# Patient Record
Sex: FEMALE | Race: WHITE | NOT HISPANIC OR LATINO | Employment: FULL TIME | URBAN - METROPOLITAN AREA
[De-identification: names, ages, dates, MRNs, and addresses within clinical notes are randomized per-mention and may not be internally consistent; named-entity substitution may affect disease eponyms.]

---

## 2017-04-13 ENCOUNTER — INITIAL PRENATAL (OUTPATIENT)
Dept: OBSTETRICS AND GYNECOLOGY | Facility: CLINIC | Age: 27
End: 2017-04-13

## 2017-04-13 VITALS — DIASTOLIC BLOOD PRESSURE: 80 MMHG | WEIGHT: 147.6 LBS | SYSTOLIC BLOOD PRESSURE: 135 MMHG

## 2017-04-13 DIAGNOSIS — Z12.4 SCREENING FOR CERVICAL CANCER: ICD-10-CM

## 2017-04-13 DIAGNOSIS — O09.32 LATE PRENATAL CARE AFFECTING PREGNANCY IN SECOND TRIMESTER: ICD-10-CM

## 2017-04-13 DIAGNOSIS — Z11.4 SCREENING FOR HIV (HUMAN IMMUNODEFICIENCY VIRUS): ICD-10-CM

## 2017-04-13 DIAGNOSIS — F11.20 METHADONE MAINTENANCE TREATMENT AFFECTING PREGNANCY IN SECOND TRIMESTER (HCC): ICD-10-CM

## 2017-04-13 DIAGNOSIS — O99.332 TOBACCO SMOKING AFFECTING PREGNANCY IN SECOND TRIMESTER: ICD-10-CM

## 2017-04-13 DIAGNOSIS — Z11.3 SCREEN FOR STD (SEXUALLY TRANSMITTED DISEASE): ICD-10-CM

## 2017-04-13 DIAGNOSIS — Z34.02 ENCOUNTER FOR SUPERVISION OF NORMAL FIRST PREGNANCY IN SECOND TRIMESTER: Primary | ICD-10-CM

## 2017-04-13 DIAGNOSIS — O99.322 METHADONE MAINTENANCE TREATMENT AFFECTING PREGNANCY IN SECOND TRIMESTER (HCC): ICD-10-CM

## 2017-04-13 LAB
EXTERNAL GC/CHLAMYDIA: NORMAL
EXTERNAL THINPREP: NORMAL
EXTERNAL URINE CULTURE: NORMAL

## 2017-04-13 PROCEDURE — 99406 BEHAV CHNG SMOKING 3-10 MIN: CPT | Performed by: OBSTETRICS & GYNECOLOGY

## 2017-04-13 PROCEDURE — 0501F PRENATAL FLOW SHEET: CPT | Performed by: OBSTETRICS & GYNECOLOGY

## 2017-04-13 RX ORDER — METHADONE HYDROCHLORIDE 10 MG/ML
70 CONCENTRATE ORAL DAILY
COMMUNITY

## 2017-04-13 NOTE — PROGRESS NOTES
Chief complaint: Pregnancy  History present illness: Patient is here for initial prenatal visit today.  She is without complaints.  She has not been seen by an obstetrician this pregnancy.  She has not had an ultrasound her blood work done.  She denies contractions, vaginal bleeding, leakage of fluid.  The patient does take methadone 60 mg per day.  She has been on this for about 5 years total.  She has a previous history of opioid narcotic abuse.  She denies any history of IV drug use.  She denies any current signs or symptoms of withdrawal.    History reviewed. No pertinent past medical history.     Past Surgical History:   Procedure Laterality Date   • APPENDECTOMY       History reviewed. No pertinent family history.     Social History   Substance Use Topics   • Smoking status: Current Every Day Smoker     Packs/day: 1.00     Types: Cigarettes   • Smokeless tobacco: None   • Alcohol use No       Current Outpatient Prescriptions:   •  methadone (DOLOPHINE) 10 MG tablet, Take 60 mg by mouth Every 6 (Six) Hours As Needed for Moderate Pain (4-6),, Disp: , Rfl:      Allergies   Allergen Reactions   • Ibuprofen      ROS:  General: No fever or chills  Constitutional: No weight loss or gain, no hair loss  HENT: No headache, no hearing loss, no tinnitus  Eyes: normal vision, no eye pain  Lungs: No cough, no shortness of breath  Heart: No chest pain, no palpitations  Abdomen: No nausea, vomiting, constipation or diarrhea  : No dysuria, no hematuria  Skin: No rashes  Lymph: No swelling  Neuro: No parathesia, no weakness  Psych: Normal though content, no hallucinations, no SI/HI    Pe:  Vitals:    17 0920   BP: 135/80   Weight: 147 lb 9.6 oz (67 kg)   PE: see prenatal physicial    Assessment:  1.  28 yo  2 para 0 at 22-0/7 weeks gestational age by an uncertain last menstrual period  2.  Maternal methadone use  3.  Tobacco use in pregnancy  Plan:  1.  Initial prenatal counseling performed today.  Prenatal care  educational handouts given to the patient.  Patient is due for her Pap smear today.  Pap smear done.  Gonorrhea and chlamydia test today.  Prenatal labs today.  Start a daily prenatal vitamin.  Patient reports that the last prenatal vitamin that she took causes cramping and diarrhea.  I advised that she may try Flintstones chewable.  2.  The risks of tobacco use term pregnancy were discussed with the patient.  Adverse fetal effects were discussed.  Advised total cessation.  Greater than 3 minutes was spent in tobacco cessation counseling.  3.  Discussed issues related to opioid use in pregnancy.  Discussed issues related to methadone use.  We discussed the prior studies done in pregnant women taking methadone.  We discussed the long-term safety of methadone use term pregnancy.  We did discuss the risk of  abstinence change and with a very high rate of prolonged admission for babies due to  abstinence syndrome and women taking methadone.  Despite this, the risk of discontinue methadone at this point in the pregnancy would likely include withdrawal,  labor,  delivery, miscarriage, stillbirth etc.  I would advise the patient that she should likely consider continuation of methadone at the lowest effective dose for the remainder of the pregnancy.  She verbalizes understanding and agrees with the plan.  4.  Ultrasound next available for dating and anatomy.  5.  Return to the office in 4 weeks.

## 2017-04-14 LAB
ABO GROUP BLD: (no result)
BASOPHILS # BLD AUTO: 0 X10E3/UL (ref 0–0.2)
BASOPHILS NFR BLD AUTO: 0 %
BLD GP AB SCN SERPL QL: NEGATIVE
EOSINOPHIL # BLD AUTO: 0.4 X10E3/UL (ref 0–0.4)
EOSINOPHIL NFR BLD AUTO: 5 %
ERYTHROCYTE [DISTWIDTH] IN BLOOD BY AUTOMATED COUNT: 12.8 % (ref 12.3–15.4)
HBV SURFACE AG SERPL QL IA: NEGATIVE
HCT VFR BLD AUTO: 37.8 % (ref 34–46.6)
HCV AB S/CO SERPL IA: <0.1 S/CO RATIO (ref 0–0.9)
HGB BLD-MCNC: 12.5 G/DL (ref 11.1–15.9)
HIV 1+2 AB+HIV1 P24 AG SERPL QL IA: NON REACTIVE
IMM GRANULOCYTES # BLD: 0 X10E3/UL (ref 0–0.1)
IMM GRANULOCYTES NFR BLD: 0 %
LYMPHOCYTES # BLD AUTO: 2.2 X10E3/UL (ref 0.7–3.1)
LYMPHOCYTES NFR BLD AUTO: 25 %
MCH RBC QN AUTO: 30.5 PG (ref 26.6–33)
MCHC RBC AUTO-ENTMCNC: 33.1 G/DL (ref 31.5–35.7)
MCV RBC AUTO: 92 FL (ref 79–97)
MONOCYTES # BLD AUTO: 0.9 X10E3/UL (ref 0.1–0.9)
MONOCYTES NFR BLD AUTO: 11 %
NEUTROPHILS # BLD AUTO: 5.3 X10E3/UL (ref 1.4–7)
NEUTROPHILS NFR BLD AUTO: 59 %
PLATELET # BLD AUTO: 215 X10E3/UL (ref 150–379)
RBC # BLD AUTO: 4.1 X10E6/UL (ref 3.77–5.28)
RH BLD: POSITIVE
RPR SER QL: NON REACTIVE
RUBV IGG SERPL IA-ACNC: 2.31 INDEX
WBC # BLD AUTO: 8.9 X10E3/UL (ref 3.4–10.8)

## 2017-04-15 LAB
BACTERIA UR CULT: NO GROWTH
BACTERIA UR CULT: NORMAL

## 2017-04-17 ENCOUNTER — PROCEDURE VISIT (OUTPATIENT)
Dept: OBSTETRICS AND GYNECOLOGY | Facility: CLINIC | Age: 27
End: 2017-04-17

## 2017-04-17 DIAGNOSIS — O99.332 TOBACCO SMOKING AFFECTING PREGNANCY IN SECOND TRIMESTER: ICD-10-CM

## 2017-04-17 DIAGNOSIS — O99.322 METHADONE MAINTENANCE TREATMENT AFFECTING PREGNANCY IN SECOND TRIMESTER (HCC): ICD-10-CM

## 2017-04-17 DIAGNOSIS — Z36.3 ANTENATAL SCREENING FOR MALFORMATION USING ULTRASONICS: Primary | ICD-10-CM

## 2017-04-17 DIAGNOSIS — F11.20 METHADONE MAINTENANCE TREATMENT AFFECTING PREGNANCY IN SECOND TRIMESTER (HCC): ICD-10-CM

## 2017-04-17 DIAGNOSIS — O09.32 LATE PRENATAL CARE AFFECTING PREGNANCY IN SECOND TRIMESTER: ICD-10-CM

## 2017-04-17 PROCEDURE — 76805 OB US >/= 14 WKS SNGL FETUS: CPT | Performed by: OBSTETRICS & GYNECOLOGY

## 2017-04-18 LAB
C TRACH RRNA CVX QL NAA+PROBE: NEGATIVE
CONV .: NORMAL
CYTOLOGIST CVX/VAG CYTO: NORMAL
CYTOLOGY CVX/VAG DOC THIN PREP: NORMAL
DX ICD CODE: NORMAL
HIV 1 & 2 AB SER-IMP: NORMAL
N GONORRHOEA RRNA CVX QL NAA+PROBE: NEGATIVE
OTHER STN SPEC: NORMAL
PATH REPORT.FINAL DX SPEC: NORMAL
STAT OF ADQ CVX/VAG CYTO-IMP: NORMAL
T VAGINALIS RRNA SPEC QL NAA+PROBE: NEGATIVE

## 2017-04-19 ENCOUNTER — TELEPHONE (OUTPATIENT)
Dept: OBSTETRICS AND GYNECOLOGY | Facility: CLINIC | Age: 27
End: 2017-04-19

## 2017-04-19 NOTE — TELEPHONE ENCOUNTER
----- Message from Hung Berry MD sent at 4/18/2017  5:17 PM EDT -----  Notify the patient that her Pap smear was normal, and her gonorrhea and chlamydia tests were negative

## 2017-04-19 NOTE — TELEPHONE ENCOUNTER
----- Message from Angelic Metcalf sent at 4/19/2017  8:50 AM EDT -----  Regarding: Lab Results  Patient called back, per notes i let the patient know her pap was normal and tests were negative

## 2017-04-22 LAB
AMPHETAMINES SERPL QL SCN: NEGATIVE NG/ML
BARBITURATES SERPL QL SCN: NEGATIVE UG/ML
BENZODIAZ SERPL QL SCN: NEGATIVE NG/ML
CANNABINOIDS SERPL QL SCN: NEGATIVE NG/ML
COCAINE+BZE SERPL QL SCN: NEGATIVE NG/ML
METHADONE SERPL QL SCN: ABNORMAL NG/ML
METHADONE SPEC CFM-MCNC: 365 NG/ML
METHADONE SPEC QL: POSITIVE
OPIATES SERPL QL SCN: NEGATIVE NG/ML
OXYCODONE+OXYMORPHONE SERPLBLD QL SCN: NEGATIVE NG/ML
PCP SERPL QL SCN: NEGATIVE NG/ML
PROPOXYPH SERPL QL SCN: NEGATIVE NG/ML

## 2017-05-11 ENCOUNTER — ROUTINE PRENATAL (OUTPATIENT)
Dept: OBSTETRICS AND GYNECOLOGY | Facility: CLINIC | Age: 27
End: 2017-05-11

## 2017-05-11 VITALS
DIASTOLIC BLOOD PRESSURE: 75 MMHG | BODY MASS INDEX: 25.33 KG/M2 | SYSTOLIC BLOOD PRESSURE: 129 MMHG | WEIGHT: 152 LBS | HEIGHT: 65 IN

## 2017-05-11 DIAGNOSIS — O09.32 LATE PRENATAL CARE AFFECTING PREGNANCY IN SECOND TRIMESTER: ICD-10-CM

## 2017-05-11 DIAGNOSIS — O99.322 METHADONE MAINTENANCE TREATMENT AFFECTING PREGNANCY IN SECOND TRIMESTER (HCC): ICD-10-CM

## 2017-05-11 DIAGNOSIS — Z34.02 ENCOUNTER FOR SUPERVISION OF NORMAL FIRST PREGNANCY IN SECOND TRIMESTER: Primary | ICD-10-CM

## 2017-05-11 DIAGNOSIS — F11.20 METHADONE MAINTENANCE TREATMENT AFFECTING PREGNANCY IN SECOND TRIMESTER (HCC): ICD-10-CM

## 2017-05-11 DIAGNOSIS — Z13.1 SCREENING FOR DIABETES MELLITUS: ICD-10-CM

## 2017-05-11 PROBLEM — Z11.3 SCREEN FOR STD (SEXUALLY TRANSMITTED DISEASE): Status: RESOLVED | Noted: 2017-04-13 | Resolved: 2017-05-11

## 2017-05-11 PROBLEM — Z11.4 SCREENING FOR HIV (HUMAN IMMUNODEFICIENCY VIRUS): Status: RESOLVED | Noted: 2017-04-13 | Resolved: 2017-05-11

## 2017-05-11 PROBLEM — Z12.4 SCREENING FOR CERVICAL CANCER: Status: RESOLVED | Noted: 2017-04-13 | Resolved: 2017-05-11

## 2017-05-11 PROCEDURE — 0502F SUBSEQUENT PRENATAL CARE: CPT | Performed by: OBSTETRICS & GYNECOLOGY

## 2017-06-08 ENCOUNTER — RESULTS ENCOUNTER (OUTPATIENT)
Dept: OBSTETRICS AND GYNECOLOGY | Facility: CLINIC | Age: 27
End: 2017-06-08

## 2017-06-08 ENCOUNTER — ROUTINE PRENATAL (OUTPATIENT)
Dept: OBSTETRICS AND GYNECOLOGY | Facility: CLINIC | Age: 27
End: 2017-06-08

## 2017-06-08 VITALS — DIASTOLIC BLOOD PRESSURE: 85 MMHG | BODY MASS INDEX: 25.63 KG/M2 | WEIGHT: 154 LBS | SYSTOLIC BLOOD PRESSURE: 137 MMHG

## 2017-06-08 DIAGNOSIS — Z13.1 SCREENING FOR DIABETES MELLITUS: ICD-10-CM

## 2017-06-08 DIAGNOSIS — Z34.02 ENCOUNTER FOR SUPERVISION OF NORMAL FIRST PREGNANCY IN SECOND TRIMESTER: ICD-10-CM

## 2017-06-08 DIAGNOSIS — F11.20 METHADONE MAINTENANCE TREATMENT AFFECTING PREGNANCY IN SECOND TRIMESTER (HCC): Primary | ICD-10-CM

## 2017-06-08 DIAGNOSIS — O09.32 LATE PRENATAL CARE AFFECTING PREGNANCY IN SECOND TRIMESTER: ICD-10-CM

## 2017-06-08 DIAGNOSIS — O99.322 METHADONE MAINTENANCE TREATMENT AFFECTING PREGNANCY IN SECOND TRIMESTER (HCC): Primary | ICD-10-CM

## 2017-06-08 LAB
EXTERNAL GTT 1 HOUR: 98
GLUCOSE 1H P 50 G GLC PO SERPL-MCNC: 98 MG/DL (ref 65–139)

## 2017-06-08 PROCEDURE — 0502F SUBSEQUENT PRENATAL CARE: CPT | Performed by: OBSTETRICS & GYNECOLOGY

## 2017-06-08 NOTE — PROGRESS NOTES
Chief complaint: Pregnancy  History present illness: Patient is here for her routine prenatal visit.  She is currently on methadone maintenance at 60 mg per day.  She does report some mild withdrawal symptoms.  She reports that the clinic plans to increase her dose over the next 1-2 weeks.  Otherwise she is doing well.  She reports very active fetal movement.  No contractions.  Objective: See vital signs above  Gen.: No acute distress, awake and oriented ×3  Abdomen: Soft, nontender, fundal height 26 7 m, fetal heart tones 140  Extremities: No calf tenderness, no lower extremity edema  Assessment:  1.  27-year-old  2 para 0 at 26-5/7 weeks gestational age  2.  Methadone maintenance during the second trimester  3.  Tobacco use during the second trimester  Plan:  1.  Continue with methadone.  I explained it is often necessary to increase methadone dosage during pregnancy.  She will work with her methadone clinic on this.  We have discussed the risks of  abstinence syndrome at length.  She verbalizes understanding.  2.  Glucose screen today.  3.  Return to the office in 2 weeks.  Ultrasound for growth in 2 weeks secondary to maternal tobacco and methadone use.  I spent 10 out of 15 minutes with the patient in face to face counseling of the above issues.

## 2017-06-20 ENCOUNTER — TELEPHONE (OUTPATIENT)
Dept: OBSTETRICS AND GYNECOLOGY | Facility: CLINIC | Age: 27
End: 2017-06-20

## 2017-06-20 NOTE — TELEPHONE ENCOUNTER
----- Message from Tammy Garner sent at 6/20/2017 11:39 AM EDT -----  Contact: Pt (Norman)  Ob pt's  called (on [facility] Norman) stating pt has been having pelvic and abdominal pain/pressure since yesterday along with very heavy vaginal discharge(No bleeding). Norman said pt said baby is moving but is having sharp pain. Spoke with Niya, she spoke to Dr. Berry, he advised pt to go to [facility] L[facility].  is aware.

## 2017-06-27 ENCOUNTER — PROCEDURE VISIT (OUTPATIENT)
Dept: OBSTETRICS AND GYNECOLOGY | Facility: CLINIC | Age: 27
End: 2017-06-27

## 2017-06-27 ENCOUNTER — ROUTINE PRENATAL (OUTPATIENT)
Dept: OBSTETRICS AND GYNECOLOGY | Facility: CLINIC | Age: 27
End: 2017-06-27

## 2017-06-27 VITALS — BODY MASS INDEX: 26.29 KG/M2 | DIASTOLIC BLOOD PRESSURE: 87 MMHG | SYSTOLIC BLOOD PRESSURE: 136 MMHG | WEIGHT: 158 LBS

## 2017-06-27 DIAGNOSIS — O99.322 METHADONE MAINTENANCE TREATMENT AFFECTING PREGNANCY IN SECOND TRIMESTER (HCC): ICD-10-CM

## 2017-06-27 DIAGNOSIS — O99.333 TOBACCO SMOKING AFFECTING PREGNANCY IN THIRD TRIMESTER: ICD-10-CM

## 2017-06-27 DIAGNOSIS — F11.20 METHADONE MAINTENANCE TREATMENT AFFECTING PREGNANCY IN SECOND TRIMESTER (HCC): ICD-10-CM

## 2017-06-27 DIAGNOSIS — Z23 NEED FOR TDAP VACCINATION: ICD-10-CM

## 2017-06-27 DIAGNOSIS — F11.20 METHADONE MAINTENANCE TREATMENT AFFECTING PREGNANCY IN THIRD TRIMESTER (HCC): ICD-10-CM

## 2017-06-27 DIAGNOSIS — Z36.89 ENCOUNTER FOR ULTRASOUND TO CHECK FETAL GROWTH: Primary | ICD-10-CM

## 2017-06-27 DIAGNOSIS — O99.323 METHADONE MAINTENANCE TREATMENT AFFECTING PREGNANCY IN THIRD TRIMESTER (HCC): ICD-10-CM

## 2017-06-27 DIAGNOSIS — O32.8XX0: ICD-10-CM

## 2017-06-27 DIAGNOSIS — O09.32 LATE PRENATAL CARE AFFECTING PREGNANCY IN SECOND TRIMESTER: ICD-10-CM

## 2017-06-27 DIAGNOSIS — Z34.03 ENCOUNTER FOR SUPERVISION OF NORMAL FIRST PREGNANCY IN THIRD TRIMESTER: Primary | ICD-10-CM

## 2017-06-27 DIAGNOSIS — O99.330 TOBACCO SMOKING AFFECTING PREGNANCY: ICD-10-CM

## 2017-06-27 PROBLEM — Z13.1 SCREENING FOR DIABETES MELLITUS: Status: RESOLVED | Noted: 2017-05-11 | Resolved: 2017-06-27

## 2017-06-27 PROCEDURE — 90471 IMMUNIZATION ADMIN: CPT | Performed by: OBSTETRICS & GYNECOLOGY

## 2017-06-27 PROCEDURE — 90715 TDAP VACCINE 7 YRS/> IM: CPT | Performed by: OBSTETRICS & GYNECOLOGY

## 2017-06-27 PROCEDURE — 0502F SUBSEQUENT PRENATAL CARE: CPT | Performed by: OBSTETRICS & GYNECOLOGY

## 2017-06-27 PROCEDURE — 76816 OB US FOLLOW-UP PER FETUS: CPT | Performed by: OBSTETRICS & GYNECOLOGY

## 2017-06-27 NOTE — PROGRESS NOTES
Chief complaint: Pregnancy  Subjective: Here for routine prenatal visit.  No major complaints today.  She does note some sciatic nerve pain in the right side.  Otherwise doing well.  Good fetal movement.  Objective: See vital signs in flow sheet  Gen.: No acute distress, awake and oriented ×3  Abdomen: Soft, nontender, fetal heart tones 136  Extremities: No lower extremity edema  Labs: One-hour glucose challenge 98  Ultrasound today: Estimated fetal weight 2 lbs. 15 oz. or the 46th percentile.  Amniotic fluid index 14 cm.  Footling breech presentation.  Assessment:  1.  27-year-old  2 para 0 at 29-3/7 weeks gestational age  2.  Methadone maintenance in pregnancy, currently at 65 mg per day  3.  Footling breech presentation  4.  Tobacco use  Plan:  1.  Reassurance offered about sciatic nerve pain.  2.  Patient without any signs or symptoms of withdrawal.  She does report that her clinic is planning to increase her dose to 70 mg at the next visit.  3.  Ultrasound findings discussed with the patient at length today.  We will repeat growth every 4 weeks secondary to methadone maintenance.  We will also need to check for presentation at the next visit.  4.  OB follow-up in 2 and 4 weeks.  Ultrasound in 4 weeks.

## 2017-07-13 ENCOUNTER — ROUTINE PRENATAL (OUTPATIENT)
Dept: OBSTETRICS AND GYNECOLOGY | Facility: CLINIC | Age: 27
End: 2017-07-13

## 2017-07-13 VITALS — BODY MASS INDEX: 26.13 KG/M2 | DIASTOLIC BLOOD PRESSURE: 74 MMHG | SYSTOLIC BLOOD PRESSURE: 129 MMHG | WEIGHT: 157 LBS

## 2017-07-13 DIAGNOSIS — F11.20 METHADONE MAINTENANCE TREATMENT AFFECTING PREGNANCY IN THIRD TRIMESTER (HCC): Primary | ICD-10-CM

## 2017-07-13 DIAGNOSIS — O32.8XX0: ICD-10-CM

## 2017-07-13 DIAGNOSIS — O99.333 TOBACCO SMOKING AFFECTING PREGNANCY IN THIRD TRIMESTER: ICD-10-CM

## 2017-07-13 DIAGNOSIS — O99.323 METHADONE MAINTENANCE TREATMENT AFFECTING PREGNANCY IN THIRD TRIMESTER (HCC): Primary | ICD-10-CM

## 2017-07-13 DIAGNOSIS — Z34.03 ENCOUNTER FOR SUPERVISION OF NORMAL FIRST PREGNANCY IN THIRD TRIMESTER: ICD-10-CM

## 2017-07-13 PROBLEM — Z23 NEED FOR TDAP VACCINATION: Status: RESOLVED | Noted: 2017-06-27 | Resolved: 2017-07-13

## 2017-07-13 PROCEDURE — 0502F SUBSEQUENT PRENATAL CARE: CPT | Performed by: OBSTETRICS & GYNECOLOGY

## 2017-07-13 NOTE — PROGRESS NOTES
Patient is here for routine OB follow-up.  She is without complaints today.  She reports very active fetal movement.  She thinks the baby may have flipped into a cephalic presentation.  She recently increased her methadone dosage to 70 mg, and she reports that she feels stable on this dose.  Patient does report fetal movement and sound consistent with pickups.  She reports rhythmic regular movements that occur every few seconds and last 10-20 minutes or so.  Reassurance is offered.  Plan to return to the office in 2 weeks.  Ultrasound for growth secondary to maternal methadone usage in 2 weeks.  Also check on fetal position of that ultrasound.

## 2017-07-25 ENCOUNTER — ROUTINE PRENATAL (OUTPATIENT)
Dept: OBSTETRICS AND GYNECOLOGY | Facility: CLINIC | Age: 27
End: 2017-07-25

## 2017-07-25 ENCOUNTER — PROCEDURE VISIT (OUTPATIENT)
Dept: OBSTETRICS AND GYNECOLOGY | Facility: CLINIC | Age: 27
End: 2017-07-25

## 2017-07-25 VITALS — WEIGHT: 158 LBS | DIASTOLIC BLOOD PRESSURE: 80 MMHG | BODY MASS INDEX: 26.29 KG/M2 | SYSTOLIC BLOOD PRESSURE: 129 MMHG

## 2017-07-25 DIAGNOSIS — O99.333 TOBACCO SMOKING AFFECTING PREGNANCY IN THIRD TRIMESTER: ICD-10-CM

## 2017-07-25 DIAGNOSIS — O99.323 METHADONE MAINTENANCE TREATMENT AFFECTING PREGNANCY IN THIRD TRIMESTER (HCC): ICD-10-CM

## 2017-07-25 DIAGNOSIS — O99.323 METHADONE MAINTENANCE TREATMENT AFFECTING PREGNANCY IN THIRD TRIMESTER (HCC): Primary | ICD-10-CM

## 2017-07-25 DIAGNOSIS — F11.20 METHADONE MAINTENANCE TREATMENT AFFECTING PREGNANCY IN THIRD TRIMESTER (HCC): ICD-10-CM

## 2017-07-25 DIAGNOSIS — F11.20 METHADONE MAINTENANCE TREATMENT AFFECTING PREGNANCY IN THIRD TRIMESTER (HCC): Primary | ICD-10-CM

## 2017-07-25 DIAGNOSIS — O09.32 LATE PRENATAL CARE AFFECTING PREGNANCY IN SECOND TRIMESTER: ICD-10-CM

## 2017-07-25 DIAGNOSIS — Z34.03 ENCOUNTER FOR SUPERVISION OF NORMAL FIRST PREGNANCY IN THIRD TRIMESTER: Primary | ICD-10-CM

## 2017-07-25 PROBLEM — O32.8XX0 FOOTLING BREECH PRESENTATION: Status: RESOLVED | Noted: 2017-06-27 | Resolved: 2017-07-25

## 2017-07-25 PROCEDURE — 76816 OB US FOLLOW-UP PER FETUS: CPT | Performed by: OBSTETRICS & GYNECOLOGY

## 2017-07-25 PROCEDURE — 0502F SUBSEQUENT PRENATAL CARE: CPT | Performed by: OBSTETRICS & GYNECOLOGY

## 2017-07-25 NOTE — PROGRESS NOTES
Patient here for routine OB follow-up.  She has no major complaints today.  She is generally very anxious about the pregnancy.  Also concerned about the risk of  labor.  I explained that there are no findings with the current time that would make me think the patient is any more risk of  delivery and the average risk patient.  I explained that ultrasound is not a guarantee the safety her well-being of the fetus and that there is nothing on ultrasound that would usually predict  birth.  In general she feels well today.  Denies signs or symptoms of withdrawal.  See flow sheet for physical exam and vitals.  Ultrasound today shows estimated fetal weight of 4-1/2 pounds or the 34th percentile.  Abdominal circumference is the 14th percentile.  Amniotic fluid index 11.5 cm.  The baby is in the breech presentation.  These findings were discussed with the patient.  Limitations of ultrasound were discussed.  I'll plan for the patient return to the office in 2 weeks.  Plan ultrasound in 4 weeks for growth secondary methadone use as well as presentation.  The patient asked about external cephalic version.  We discussed the risks, benefits, and alternatives to external cephalic version versus primary  section for persistent breech presentation at 39 weeks of gestation.  The process of external cephalic version was described to the patient.  After discussion of the risks and benefits, the patient states that she would not want to proceed with external cephalic version, but in stable plan for a primary  section if the baby was still breech at 39 weeks.  I spent 10 out of 15 minutes with the patient in face to face counseling of the above issues.

## 2017-08-08 ENCOUNTER — ROUTINE PRENATAL (OUTPATIENT)
Dept: OBSTETRICS AND GYNECOLOGY | Facility: CLINIC | Age: 27
End: 2017-08-08

## 2017-08-08 VITALS — WEIGHT: 158 LBS | DIASTOLIC BLOOD PRESSURE: 75 MMHG | BODY MASS INDEX: 26.29 KG/M2 | SYSTOLIC BLOOD PRESSURE: 123 MMHG

## 2017-08-08 DIAGNOSIS — O99.323 METHADONE MAINTENANCE TREATMENT AFFECTING PREGNANCY IN THIRD TRIMESTER (HCC): Primary | ICD-10-CM

## 2017-08-08 DIAGNOSIS — F11.20 METHADONE MAINTENANCE TREATMENT AFFECTING PREGNANCY IN THIRD TRIMESTER (HCC): Primary | ICD-10-CM

## 2017-08-08 DIAGNOSIS — Z34.03 ENCOUNTER FOR SUPERVISION OF NORMAL FIRST PREGNANCY IN THIRD TRIMESTER: ICD-10-CM

## 2017-08-08 PROCEDURE — 99213 OFFICE O/P EST LOW 20 MIN: CPT | Performed by: OBSTETRICS & GYNECOLOGY

## 2017-08-08 NOTE — PROGRESS NOTES
Patient here for routine prenatal visit.  No major complaints today.  Good fetal movement.  See flow sheet for vital signs of physical exam.  On exam, the baby still feels breech.  Patient is scheduled for ultrasound 2 weeks for presentation.  We'll also need to check on growth given methadone use.  Patient without any signs of withdrawal.  GBS performed today.  Labor signs discussed.  We discussed the possibility of  abstinence syndrome again in detail.  They verbalized understanding.  Return to the office as scheduled in 2 weeks.  We also discussed pediatricians.

## 2017-08-10 ENCOUNTER — TELEPHONE (OUTPATIENT)
Dept: OBSTETRICS AND GYNECOLOGY | Facility: CLINIC | Age: 27
End: 2017-08-10

## 2017-08-10 NOTE — TELEPHONE ENCOUNTER
I do not feel that this is anything to be concerned about.  If the patient thinks that she needs to be evaluated prior to her next appointment, I would recommend that she go to labor and delivery at [facility], [place] for evaluation, but overall I feel these movements are common in pregnancy and not anything that should be alarming.    ----- Message from Tammy Garner sent at 8/10/2017 11:34 AM EDT -----  Contact: pt  Ob pt called stating she if feeling the baby have hiccups a lot the past 3-4 days(this morning the episode lasted 20-25 minutes, then the baby was doing weird movements after 4-5 minutes while having the hiccups). Pt states it seems to be getting worse.   Pt is concerned.    Please advise.  Pt # is [phone]

## 2017-08-12 LAB — B-HEM STREP SPEC QL CULT: NEGATIVE

## 2017-08-22 ENCOUNTER — PROCEDURE VISIT (OUTPATIENT)
Dept: OBSTETRICS AND GYNECOLOGY | Facility: CLINIC | Age: 27
End: 2017-08-22

## 2017-08-22 ENCOUNTER — ROUTINE PRENATAL (OUTPATIENT)
Dept: OBSTETRICS AND GYNECOLOGY | Facility: CLINIC | Age: 27
End: 2017-08-22

## 2017-08-22 VITALS — SYSTOLIC BLOOD PRESSURE: 134 MMHG | BODY MASS INDEX: 26.29 KG/M2 | DIASTOLIC BLOOD PRESSURE: 88 MMHG | WEIGHT: 158 LBS

## 2017-08-22 DIAGNOSIS — O99.333 TOBACCO SMOKING AFFECTING PREGNANCY IN THIRD TRIMESTER: ICD-10-CM

## 2017-08-22 DIAGNOSIS — O99.323 METHADONE MAINTENANCE TREATMENT AFFECTING PREGNANCY IN THIRD TRIMESTER (HCC): Primary | ICD-10-CM

## 2017-08-22 DIAGNOSIS — O28.8 AFI (AMNIOTIC FLUID INDEX) DECREASED: ICD-10-CM

## 2017-08-22 DIAGNOSIS — Z36.89 ENCOUNTER FOR ULTRASOUND TO CHECK FETAL GROWTH: Primary | ICD-10-CM

## 2017-08-22 DIAGNOSIS — F11.20 METHADONE MAINTENANCE TREATMENT AFFECTING PREGNANCY IN THIRD TRIMESTER (HCC): Primary | ICD-10-CM

## 2017-08-22 DIAGNOSIS — O99.323 METHADONE MAINTENANCE TREATMENT AFFECTING PREGNANCY IN THIRD TRIMESTER (HCC): ICD-10-CM

## 2017-08-22 DIAGNOSIS — O09.32 LATE PRENATAL CARE AFFECTING PREGNANCY IN SECOND TRIMESTER: ICD-10-CM

## 2017-08-22 DIAGNOSIS — F11.20 METHADONE MAINTENANCE TREATMENT AFFECTING PREGNANCY IN THIRD TRIMESTER (HCC): ICD-10-CM

## 2017-08-22 PROCEDURE — 76816 OB US FOLLOW-UP PER FETUS: CPT | Performed by: OBSTETRICS & GYNECOLOGY

## 2017-08-22 PROCEDURE — 0502F SUBSEQUENT PRENATAL CARE: CPT | Performed by: OBSTETRICS & GYNECOLOGY

## 2017-08-22 PROCEDURE — 76818 FETAL BIOPHYS PROFILE W/NST: CPT | Performed by: OBSTETRICS & GYNECOLOGY

## 2017-08-22 NOTE — PROGRESS NOTES
Patient is here for her routine prenatal visit.  She is without major physical complaints.  Patient reports normal fetal movement, but reports the babies generally more active in the afternoon.  Patient denies any withdrawal signs or symptoms.  Her methadone dose has been stable.  No contractions.  See flow sheet for vital signs and physical exam.  Patient had ultrasound today for growth secondary to her use of methadone.  Overall estimated fetal weight is the 26th percentile (was previously the 33rd percentile).  Abdominal circumference 13 percentile (was previously the 14th percentile).  Baby is still in a byron breech presentation.  Amniotic fluid index is 8 cm.   I explained to the patient that while the baby is on the small side, overall the growth has been appropriate from the last visit.  Her amniotic fluid index is mildly decreased.  I recommended the patient increase oral hydration.  We performed an NST today, which was initially nonreactive.  This may have been secondary to her taking her methadone dose earlier this morning.  The patient reports that she has also not eaten anything today.  I sent the patient for biophysical profile testing which was 8/10 oh (2 points off for nonreactive NST).  Overall reassuring.  I would like for the patient to repeat her ultrasound for amniotic fluid index and biophysical profile in 2 days.  I will see her back in one week.  We discussed the findings of the persistent breech presentation.  I recommend primary  section at 39 weeks at the baby is still breech.  Amniotic fluid index continues to decrease, or if  testing is otherwise nonreassuring, she may require delivery sooner than 39 weeks.  She verbalizes understanding.

## 2017-08-24 ENCOUNTER — ANESTHESIA EVENT (OUTPATIENT)
Dept: LABOR AND DELIVERY | Facility: HOSPITAL | Age: 27
End: 2017-08-24

## 2017-08-24 ENCOUNTER — PROCEDURE VISIT (OUTPATIENT)
Dept: OBSTETRICS AND GYNECOLOGY | Facility: CLINIC | Age: 27
End: 2017-08-24

## 2017-08-24 ENCOUNTER — ANESTHESIA (OUTPATIENT)
Dept: LABOR AND DELIVERY | Facility: HOSPITAL | Age: 27
End: 2017-08-24

## 2017-08-24 ENCOUNTER — HOSPITAL ENCOUNTER (INPATIENT)
Facility: HOSPITAL | Age: 27
LOS: 3 days | Discharge: HOME | End: 2017-08-27
Attending: OBSTETRICS & GYNECOLOGY | Admitting: OBSTETRICS & GYNECOLOGY

## 2017-08-24 DIAGNOSIS — O99.333 TOBACCO SMOKING AFFECTING PREGNANCY IN THIRD TRIMESTER: ICD-10-CM

## 2017-08-24 DIAGNOSIS — O99.323 METHADONE MAINTENANCE TREATMENT AFFECTING PREGNANCY IN THIRD TRIMESTER (HCC): Primary | ICD-10-CM

## 2017-08-24 DIAGNOSIS — O99.323 METHADONE MAINTENANCE TREATMENT AFFECTING PREGNANCY IN THIRD TRIMESTER (HCC): ICD-10-CM

## 2017-08-24 DIAGNOSIS — F11.20 METHADONE MAINTENANCE TREATMENT AFFECTING PREGNANCY IN THIRD TRIMESTER (HCC): Primary | ICD-10-CM

## 2017-08-24 DIAGNOSIS — F11.20 METHADONE MAINTENANCE TREATMENT AFFECTING PREGNANCY IN THIRD TRIMESTER (HCC): ICD-10-CM

## 2017-08-24 DIAGNOSIS — O09.32 LATE PRENATAL CARE AFFECTING PREGNANCY IN SECOND TRIMESTER: ICD-10-CM

## 2017-08-24 PROBLEM — Z34.90 PREGNANCY: Status: ACTIVE | Noted: 2017-08-24

## 2017-08-24 PROBLEM — Z98.891 S/P PRIMARY LOW TRANSVERSE C-SECTION: Status: ACTIVE | Noted: 2017-08-24

## 2017-08-24 LAB
ABO GROUP BLD: NORMAL
AMPHET+METHAMPHET UR QL: NEGATIVE
BARBITURATES UR QL SCN: NEGATIVE
BASOPHILS # BLD AUTO: 0.01 10*3/MM3 (ref 0–0.2)
BASOPHILS NFR BLD AUTO: 0.1 % (ref 0–1.5)
BENZODIAZ UR QL SCN: NEGATIVE
BLD GP AB SCN SERPL QL: NEGATIVE
CANNABINOIDS SERPL QL: NEGATIVE
COCAINE UR QL: NEGATIVE
DEPRECATED RDW RBC AUTO: 42.1 FL (ref 37–54)
EOSINOPHIL # BLD AUTO: 0.08 10*3/MM3 (ref 0–0.7)
EOSINOPHIL NFR BLD AUTO: 1 % (ref 0.3–6.2)
ERYTHROCYTE [DISTWIDTH] IN BLOOD BY AUTOMATED COUNT: 12.4 % (ref 11.7–13)
HCT VFR BLD AUTO: 41.9 % (ref 35.6–45.5)
HGB BLD-MCNC: 14.1 G/DL (ref 11.9–15.5)
IMM GRANULOCYTES # BLD: 0.03 10*3/MM3 (ref 0–0.03)
IMM GRANULOCYTES NFR BLD: 0.4 % (ref 0–0.5)
LYMPHOCYTES # BLD AUTO: 2.43 10*3/MM3 (ref 0.9–4.8)
LYMPHOCYTES NFR BLD AUTO: 30.4 % (ref 19.6–45.3)
MCH RBC QN AUTO: 31.1 PG (ref 26.9–32)
MCHC RBC AUTO-ENTMCNC: 33.7 G/DL (ref 32.4–36.3)
MCV RBC AUTO: 92.5 FL (ref 80.5–98.2)
METHADONE UR QL SCN: POSITIVE
MONOCYTES # BLD AUTO: 0.94 10*3/MM3 (ref 0.2–1.2)
MONOCYTES NFR BLD AUTO: 11.8 % (ref 5–12)
NEUTROPHILS # BLD AUTO: 4.51 10*3/MM3 (ref 1.9–8.1)
NEUTROPHILS NFR BLD AUTO: 56.3 % (ref 42.7–76)
OPIATES UR QL: NEGATIVE
OXYCODONE UR QL SCN: NEGATIVE
PLATELET # BLD AUTO: 149 10*3/MM3 (ref 140–500)
PMV BLD AUTO: 12.6 FL (ref 6–12)
RBC # BLD AUTO: 4.53 10*6/MM3 (ref 3.9–5.2)
RH BLD: POSITIVE
WBC NRBC COR # BLD: 8 10*3/MM3 (ref 4.5–10.7)

## 2017-08-24 PROCEDURE — 25010000002 PROPOFOL 10 MG/ML EMULSION: Performed by: ANESTHESIOLOGY

## 2017-08-24 PROCEDURE — 25010000003 CEFAZOLIN IN DEXTROSE 2-4 GM/100ML-% SOLUTION: Performed by: OBSTETRICS & GYNECOLOGY

## 2017-08-24 PROCEDURE — 86901 BLOOD TYPING SEROLOGIC RH(D): CPT | Performed by: OBSTETRICS & GYNECOLOGY

## 2017-08-24 PROCEDURE — 80307 DRUG TEST PRSMV CHEM ANLYZR: CPT | Performed by: OBSTETRICS & GYNECOLOGY

## 2017-08-24 PROCEDURE — 85025 COMPLETE CBC W/AUTO DIFF WBC: CPT | Performed by: OBSTETRICS & GYNECOLOGY

## 2017-08-24 PROCEDURE — 25010000002 MORPHINE PER 10 MG: Performed by: ANESTHESIOLOGY

## 2017-08-24 PROCEDURE — 59510 CESAREAN DELIVERY: CPT | Performed by: OBSTETRICS & GYNECOLOGY

## 2017-08-24 PROCEDURE — 86850 RBC ANTIBODY SCREEN: CPT | Performed by: OBSTETRICS & GYNECOLOGY

## 2017-08-24 PROCEDURE — 86900 BLOOD TYPING SEROLOGIC ABO: CPT | Performed by: OBSTETRICS & GYNECOLOGY

## 2017-08-24 PROCEDURE — 25010000002 HYDROMORPHONE PER 4 MG: Performed by: ANESTHESIOLOGY

## 2017-08-24 PROCEDURE — 25010000002 HYDROMORPHONE PER 4 MG

## 2017-08-24 PROCEDURE — 76818 FETAL BIOPHYS PROFILE W/NST: CPT | Performed by: OBSTETRICS & GYNECOLOGY

## 2017-08-24 RX ORDER — POLYETHYLENE GLYCOL 3350 17 G/17G
17 POWDER, FOR SOLUTION ORAL DAILY
Status: DISCONTINUED | OUTPATIENT
Start: 2017-08-25 | End: 2017-08-27 | Stop reason: HOSPADM

## 2017-08-24 RX ORDER — OXYCODONE HYDROCHLORIDE AND ACETAMINOPHEN 5; 325 MG/1; MG/1
2 TABLET ORAL EVERY 4 HOURS PRN
Status: DISCONTINUED | OUTPATIENT
Start: 2017-08-24 | End: 2017-08-27 | Stop reason: HOSPADM

## 2017-08-24 RX ORDER — METHADONE HYDROCHLORIDE 10 MG/1
70 TABLET ORAL DAILY
Status: DISCONTINUED | OUTPATIENT
Start: 2017-08-25 | End: 2017-08-27 | Stop reason: HOSPADM

## 2017-08-24 RX ORDER — ONDANSETRON 2 MG/ML
4 INJECTION INTRAMUSCULAR; INTRAVENOUS ONCE AS NEEDED
Status: DISCONTINUED | OUTPATIENT
Start: 2017-08-24 | End: 2017-08-27 | Stop reason: HOSPADM

## 2017-08-24 RX ORDER — DIPHENHYDRAMINE HYDROCHLORIDE 50 MG/ML
25 INJECTION INTRAMUSCULAR; INTRAVENOUS EVERY 4 HOURS PRN
Status: DISCONTINUED | OUTPATIENT
Start: 2017-08-24 | End: 2017-08-27 | Stop reason: HOSPADM

## 2017-08-24 RX ORDER — ZOLPIDEM TARTRATE 5 MG/1
5 TABLET ORAL NIGHTLY PRN
Status: DISCONTINUED | OUTPATIENT
Start: 2017-08-24 | End: 2017-08-27 | Stop reason: HOSPADM

## 2017-08-24 RX ORDER — DIPHENHYDRAMINE HCL 25 MG
25 CAPSULE ORAL EVERY 6 HOURS PRN
COMMUNITY

## 2017-08-24 RX ORDER — CEFAZOLIN SODIUM 2 G/100ML
2 INJECTION, SOLUTION INTRAVENOUS ONCE
Status: COMPLETED | OUTPATIENT
Start: 2017-08-24 | End: 2017-08-24

## 2017-08-24 RX ORDER — DOCUSATE SODIUM 100 MG/1
100 CAPSULE, LIQUID FILLED ORAL 2 TIMES DAILY PRN
Status: DISCONTINUED | OUTPATIENT
Start: 2017-08-24 | End: 2017-08-27 | Stop reason: HOSPADM

## 2017-08-24 RX ORDER — LANOLIN 100 %
OINTMENT (GRAM) TOPICAL
Status: DISCONTINUED | OUTPATIENT
Start: 2017-08-24 | End: 2017-08-27 | Stop reason: HOSPADM

## 2017-08-24 RX ORDER — MORPHINE SULFATE 1 MG/ML
INJECTION, SOLUTION EPIDURAL; INTRATHECAL; INTRAVENOUS AS NEEDED
Status: DISCONTINUED | OUTPATIENT
Start: 2017-08-24 | End: 2017-08-24 | Stop reason: SURG

## 2017-08-24 RX ORDER — MORPHINE SULFATE 2 MG/ML
2 INJECTION, SOLUTION INTRAMUSCULAR; INTRAVENOUS
Status: DISPENSED | OUTPATIENT
Start: 2017-08-24 | End: 2017-08-25

## 2017-08-24 RX ORDER — MISOPROSTOL 200 UG/1
800 TABLET ORAL AS NEEDED
Status: DISCONTINUED | OUTPATIENT
Start: 2017-08-24 | End: 2017-08-27 | Stop reason: HOSPADM

## 2017-08-24 RX ORDER — HYDROXYZINE 50 MG/1
50 TABLET, FILM COATED ORAL EVERY 6 HOURS PRN
Status: DISCONTINUED | OUTPATIENT
Start: 2017-08-24 | End: 2017-08-27 | Stop reason: HOSPADM

## 2017-08-24 RX ORDER — HYDROMORPHONE HYDROCHLORIDE 1 MG/ML
0.5 INJECTION, SOLUTION INTRAMUSCULAR; INTRAVENOUS; SUBCUTANEOUS
Status: DISCONTINUED | OUTPATIENT
Start: 2017-08-24 | End: 2017-08-27 | Stop reason: HOSPADM

## 2017-08-24 RX ORDER — ACETAMINOPHEN 500 MG
TABLET ORAL
Status: DISPENSED
Start: 2017-08-24 | End: 2017-08-25

## 2017-08-24 RX ORDER — PHYTONADIONE 2 MG/ML
INJECTION, EMULSION INTRAMUSCULAR; INTRAVENOUS; SUBCUTANEOUS
Status: DISPENSED
Start: 2017-08-24 | End: 2017-08-25

## 2017-08-24 RX ORDER — BUPIVACAINE HYDROCHLORIDE 7.5 MG/ML
INJECTION, SOLUTION EPIDURAL; RETROBULBAR AS NEEDED
Status: DISCONTINUED | OUTPATIENT
Start: 2017-08-24 | End: 2017-08-24 | Stop reason: SURG

## 2017-08-24 RX ORDER — FAMOTIDINE 10 MG/ML
20 INJECTION, SOLUTION INTRAVENOUS ONCE
Status: COMPLETED | OUTPATIENT
Start: 2017-08-24 | End: 2017-08-24

## 2017-08-24 RX ORDER — SIMETHICONE 80 MG
80 TABLET,CHEWABLE ORAL 4 TIMES DAILY PRN
Status: DISCONTINUED | OUTPATIENT
Start: 2017-08-24 | End: 2017-08-27 | Stop reason: HOSPADM

## 2017-08-24 RX ORDER — DIPHENHYDRAMINE HCL 25 MG
25 CAPSULE ORAL EVERY 4 HOURS PRN
Status: DISCONTINUED | OUTPATIENT
Start: 2017-08-24 | End: 2017-08-27 | Stop reason: HOSPADM

## 2017-08-24 RX ORDER — OXYTOCIN/RINGER'S LACTATE 10/500ML
999 PLASTIC BAG, INJECTION (ML) INTRAVENOUS ONCE
Status: COMPLETED | OUTPATIENT
Start: 2017-08-24 | End: 2017-08-24

## 2017-08-24 RX ORDER — SODIUM CHLORIDE, SODIUM LACTATE, POTASSIUM CHLORIDE, CALCIUM CHLORIDE 600; 310; 30; 20 MG/100ML; MG/100ML; MG/100ML; MG/100ML
125 INJECTION, SOLUTION INTRAVENOUS CONTINUOUS
Status: DISCONTINUED | OUTPATIENT
Start: 2017-08-24 | End: 2017-08-26 | Stop reason: HOSPADM

## 2017-08-24 RX ORDER — CARBOPROST TROMETHAMINE 250 UG/ML
250 INJECTION, SOLUTION INTRAMUSCULAR AS NEEDED
Status: DISCONTINUED | OUTPATIENT
Start: 2017-08-24 | End: 2017-08-27 | Stop reason: HOSPADM

## 2017-08-24 RX ORDER — ERYTHROMYCIN 5 MG/G
OINTMENT OPHTHALMIC
Status: DISPENSED
Start: 2017-08-24 | End: 2017-08-25

## 2017-08-24 RX ORDER — PROMETHAZINE HYDROCHLORIDE 12.5 MG/1
12.5 SUPPOSITORY RECTAL EVERY 6 HOURS PRN
Status: DISCONTINUED | OUTPATIENT
Start: 2017-08-24 | End: 2017-08-27 | Stop reason: HOSPADM

## 2017-08-24 RX ORDER — PROMETHAZINE HYDROCHLORIDE 25 MG/ML
12.5 INJECTION, SOLUTION INTRAMUSCULAR; INTRAVENOUS EVERY 6 HOURS PRN
Status: DISCONTINUED | OUTPATIENT
Start: 2017-08-24 | End: 2017-08-27 | Stop reason: HOSPADM

## 2017-08-24 RX ORDER — SODIUM CHLORIDE, SODIUM LACTATE, POTASSIUM CHLORIDE, CALCIUM CHLORIDE 600; 310; 30; 20 MG/100ML; MG/100ML; MG/100ML; MG/100ML
125 INJECTION, SOLUTION INTRAVENOUS CONTINUOUS
Status: DISCONTINUED | OUTPATIENT
Start: 2017-08-24 | End: 2017-08-27 | Stop reason: HOSPADM

## 2017-08-24 RX ORDER — ACETAMINOPHEN 500 MG
1000 TABLET ORAL ONCE
Status: COMPLETED | OUTPATIENT
Start: 2017-08-24 | End: 2017-08-24

## 2017-08-24 RX ORDER — METHYLERGONOVINE MALEATE 0.2 MG/ML
200 INJECTION INTRAVENOUS ONCE AS NEEDED
Status: DISCONTINUED | OUTPATIENT
Start: 2017-08-24 | End: 2017-08-27 | Stop reason: HOSPADM

## 2017-08-24 RX ORDER — ACETAMINOPHEN 10 MG/ML
1000 INJECTION, SOLUTION INTRAVENOUS ONCE
Status: COMPLETED | OUTPATIENT
Start: 2017-08-24 | End: 2017-08-24

## 2017-08-24 RX ORDER — PROPOFOL 10 MG/ML
VIAL (ML) INTRAVENOUS CONTINUOUS PRN
Status: DISCONTINUED | OUTPATIENT
Start: 2017-08-24 | End: 2017-08-24 | Stop reason: SURG

## 2017-08-24 RX ORDER — PRENATAL VIT NO.126/IRON/FOLIC 28MG-0.8MG
1 TABLET ORAL DAILY
Status: DISCONTINUED | OUTPATIENT
Start: 2017-08-25 | End: 2017-08-27 | Stop reason: HOSPADM

## 2017-08-24 RX ORDER — SODIUM CHLORIDE 0.9 % (FLUSH) 0.9 %
1-10 SYRINGE (ML) INJECTION AS NEEDED
Status: DISCONTINUED | OUTPATIENT
Start: 2017-08-24 | End: 2017-08-24 | Stop reason: HOSPADM

## 2017-08-24 RX ORDER — PROMETHAZINE HYDROCHLORIDE 25 MG/1
25 TABLET ORAL EVERY 6 HOURS PRN
Status: DISCONTINUED | OUTPATIENT
Start: 2017-08-24 | End: 2017-08-27 | Stop reason: HOSPADM

## 2017-08-24 RX ORDER — HYDROMORPHONE HYDROCHLORIDE 1 MG/ML
0.25 INJECTION, SOLUTION INTRAMUSCULAR; INTRAVENOUS; SUBCUTANEOUS
Status: DISCONTINUED | OUTPATIENT
Start: 2017-08-24 | End: 2017-08-24 | Stop reason: HOSPADM

## 2017-08-24 RX ORDER — LIDOCAINE HYDROCHLORIDE 10 MG/ML
5 INJECTION, SOLUTION INFILTRATION; PERINEURAL AS NEEDED
Status: DISCONTINUED | OUTPATIENT
Start: 2017-08-24 | End: 2017-08-24 | Stop reason: HOSPADM

## 2017-08-24 RX ORDER — OXYTOCIN/RINGER'S LACTATE 10/500ML
125 PLASTIC BAG, INJECTION (ML) INTRAVENOUS CONTINUOUS PRN
Status: DISCONTINUED | OUTPATIENT
Start: 2017-08-24 | End: 2017-08-24 | Stop reason: HOSPADM

## 2017-08-24 RX ADMIN — SODIUM CHLORIDE, POTASSIUM CHLORIDE, SODIUM LACTATE AND CALCIUM CHLORIDE 125 ML/HR: 600; 310; 30; 20 INJECTION, SOLUTION INTRAVENOUS at 12:49

## 2017-08-24 RX ADMIN — CEFAZOLIN SODIUM 2 G: 2 INJECTION, SOLUTION INTRAVENOUS at 17:08

## 2017-08-24 RX ADMIN — FAMOTIDINE 20 MG: 10 INJECTION INTRAVENOUS at 16:22

## 2017-08-24 RX ADMIN — SODIUM CHLORIDE, POTASSIUM CHLORIDE, SODIUM LACTATE AND CALCIUM CHLORIDE 1000 ML: 600; 310; 30; 20 INJECTION, SOLUTION INTRAVENOUS at 11:40

## 2017-08-24 RX ADMIN — PROPOFOL 50 MCG/KG/MIN: 10 INJECTION, EMULSION INTRAVENOUS at 17:46

## 2017-08-24 RX ADMIN — HYDROMORPHONE HYDROCHLORIDE 0.5 MG: 1 INJECTION, SOLUTION INTRAMUSCULAR; INTRAVENOUS; SUBCUTANEOUS at 20:55

## 2017-08-24 RX ADMIN — HYDROMORPHONE HYDROCHLORIDE 0.25 MG: 1 INJECTION, SOLUTION INTRAMUSCULAR; INTRAVENOUS; SUBCUTANEOUS at 19:47

## 2017-08-24 RX ADMIN — OXYTOCIN 999 ML/HR: 10 INJECTION, SOLUTION INTRAMUSCULAR; INTRAVENOUS at 17:34

## 2017-08-24 RX ADMIN — HYDROMORPHONE HYDROCHLORIDE 0.25 MG: 1 INJECTION, SOLUTION INTRAMUSCULAR; INTRAVENOUS; SUBCUTANEOUS at 19:11

## 2017-08-24 RX ADMIN — ACETAMINOPHEN 1000 MG: 500 TABLET ORAL at 16:22

## 2017-08-24 RX ADMIN — HYDROMORPHONE HYDROCHLORIDE 0.25 MG: 1 INJECTION, SOLUTION INTRAMUSCULAR; INTRAVENOUS; SUBCUTANEOUS at 19:58

## 2017-08-24 RX ADMIN — MORPHINE SULFATE 0.2 MG: 1 INJECTION EPIDURAL; INTRATHECAL; INTRAVENOUS at 17:18

## 2017-08-24 RX ADMIN — SODIUM CHLORIDE, POTASSIUM CHLORIDE, SODIUM LACTATE AND CALCIUM CHLORIDE: 600; 310; 30; 20 INJECTION, SOLUTION INTRAVENOUS at 17:18

## 2017-08-24 RX ADMIN — ACETAMINOPHEN 1000 MG: 10 INJECTION, SOLUTION INTRAVENOUS at 19:58

## 2017-08-24 RX ADMIN — HYDROMORPHONE HYDROCHLORIDE 0.25 MG: 1 INJECTION, SOLUTION INTRAMUSCULAR; INTRAVENOUS; SUBCUTANEOUS at 19:32

## 2017-08-24 RX ADMIN — BUPIVACAINE HYDROCHLORIDE 1.6 ML: 7.5 INJECTION, SOLUTION EPIDURAL; RETROBULBAR at 17:18

## 2017-08-24 RX ADMIN — OXYTOCIN 125 ML/HR: 10 INJECTION, SOLUTION INTRAMUSCULAR; INTRAVENOUS at 18:10

## 2017-08-24 RX ADMIN — HYDROMORPHONE HYDROCHLORIDE 0.5 MG: 1 INJECTION, SOLUTION INTRAMUSCULAR; INTRAVENOUS; SUBCUTANEOUS at 20:45

## 2017-08-24 RX ADMIN — HYDROMORPHONE HYDROCHLORIDE 0.25 MG: 1 INJECTION, SOLUTION INTRAMUSCULAR; INTRAVENOUS; SUBCUTANEOUS at 19:37

## 2017-08-24 RX ADMIN — MORPHINE SULFATE 9.8 MG: 1 INJECTION EPIDURAL; INTRATHECAL; INTRAVENOUS at 17:41

## 2017-08-24 RX ADMIN — HYDROMORPHONE HYDROCHLORIDE 0.5 MG: 1 INJECTION, SOLUTION INTRAMUSCULAR; INTRAVENOUS; SUBCUTANEOUS at 20:35

## 2017-08-24 RX ADMIN — HYDROMORPHONE HYDROCHLORIDE 0.25 MG: 1 INJECTION, SOLUTION INTRAMUSCULAR; INTRAVENOUS; SUBCUTANEOUS at 19:27

## 2017-08-24 RX ADMIN — HYDROMORPHONE HYDROCHLORIDE 0.25 MG: 1 INJECTION, SOLUTION INTRAMUSCULAR; INTRAVENOUS; SUBCUTANEOUS at 19:42

## 2017-08-24 RX ADMIN — HYDROMORPHONE HYDROCHLORIDE 0.5 MG: 1 INJECTION, SOLUTION INTRAMUSCULAR; INTRAVENOUS; SUBCUTANEOUS at 21:05

## 2017-08-24 RX ADMIN — HYDROMORPHONE HYDROCHLORIDE 0.25 MG: 1 INJECTION, SOLUTION INTRAMUSCULAR; INTRAVENOUS; SUBCUTANEOUS at 19:22

## 2017-08-24 NOTE — ANESTHESIA PREPROCEDURE EVALUATION
" Anesthesia Evaluation     Patient summary reviewed   no history of anesthetic complications:  NPO Solid Status: > 6 hours       Airway   Mallampati: II  TM distance: >3 FB  Neck ROM: full  no difficulty expected  Dental - normal exam     Pulmonary - normal exam   (+) a smoker (cigg this AM) Current,   Cardiovascular - negative cardio ROS and normal exam        Neuro/Psych  GI/Hepatic/Renal/Endo      Musculoskeletal     Abdominal    Substance History   (+) drug use (methadone 70MG Q Day)     OB/GYN    (+) Pregnant (Breech),         Other                                        Anesthesia Plan    ASA 2     spinal   (Discussed dura morph use  Goes by \"Heather\")  Anesthetic plan and risks discussed with patient.      "

## 2017-08-25 LAB
BASOPHILS # BLD AUTO: 0.02 10*3/MM3 (ref 0–0.2)
BASOPHILS NFR BLD AUTO: 0.2 % (ref 0–1.5)
DEPRECATED RDW RBC AUTO: 41.4 FL (ref 37–54)
EOSINOPHIL # BLD AUTO: 0.07 10*3/MM3 (ref 0–0.7)
EOSINOPHIL NFR BLD AUTO: 0.7 % (ref 0.3–6.2)
ERYTHROCYTE [DISTWIDTH] IN BLOOD BY AUTOMATED COUNT: 12.3 % (ref 11.7–13)
HCT VFR BLD AUTO: 38.5 % (ref 35.6–45.5)
HGB BLD-MCNC: 13.2 G/DL (ref 11.9–15.5)
IMM GRANULOCYTES # BLD: 0.02 10*3/MM3 (ref 0–0.03)
IMM GRANULOCYTES NFR BLD: 0.2 % (ref 0–0.5)
LYMPHOCYTES # BLD AUTO: 2 10*3/MM3 (ref 0.9–4.8)
LYMPHOCYTES NFR BLD AUTO: 20 % (ref 19.6–45.3)
MCH RBC QN AUTO: 31.6 PG (ref 26.9–32)
MCHC RBC AUTO-ENTMCNC: 34.3 G/DL (ref 32.4–36.3)
MCV RBC AUTO: 92.1 FL (ref 80.5–98.2)
MONOCYTES # BLD AUTO: 0.86 10*3/MM3 (ref 0.2–1.2)
MONOCYTES NFR BLD AUTO: 8.6 % (ref 5–12)
NEUTROPHILS # BLD AUTO: 7.01 10*3/MM3 (ref 1.9–8.1)
NEUTROPHILS NFR BLD AUTO: 70.3 % (ref 42.7–76)
PLATELET # BLD AUTO: 142 10*3/MM3 (ref 140–500)
PMV BLD AUTO: 12.1 FL (ref 6–12)
RBC # BLD AUTO: 4.18 10*6/MM3 (ref 3.9–5.2)
WBC NRBC COR # BLD: 9.98 10*3/MM3 (ref 4.5–10.7)

## 2017-08-25 PROCEDURE — 85025 COMPLETE CBC W/AUTO DIFF WBC: CPT | Performed by: OBSTETRICS & GYNECOLOGY

## 2017-08-25 PROCEDURE — 25010000002 MORPHINE PER 10 MG: Performed by: ANESTHESIOLOGY

## 2017-08-25 RX ADMIN — SIMETHICONE CHEW TAB 80 MG 80 MG: 80 TABLET ORAL at 09:02

## 2017-08-25 RX ADMIN — Medication: at 09:02

## 2017-08-25 RX ADMIN — OXYCODONE HYDROCHLORIDE AND ACETAMINOPHEN 2 TABLET: 5; 325 TABLET ORAL at 14:08

## 2017-08-25 RX ADMIN — OXYCODONE HYDROCHLORIDE AND ACETAMINOPHEN 2 TABLET: 5; 325 TABLET ORAL at 04:49

## 2017-08-25 RX ADMIN — OXYCODONE HYDROCHLORIDE AND ACETAMINOPHEN 2 TABLET: 5; 325 TABLET ORAL at 22:46

## 2017-08-25 RX ADMIN — METHADONE HYDROCHLORIDE 70 MG: 10 TABLET ORAL at 09:03

## 2017-08-25 RX ADMIN — MORPHINE SULFATE 2 MG: 2 INJECTION, SOLUTION INTRAMUSCULAR; INTRAVENOUS at 03:41

## 2017-08-25 RX ADMIN — OXYCODONE HYDROCHLORIDE AND ACETAMINOPHEN 2 TABLET: 5; 325 TABLET ORAL at 18:16

## 2017-08-25 RX ADMIN — OXYCODONE HYDROCHLORIDE AND ACETAMINOPHEN 2 TABLET: 5; 325 TABLET ORAL at 09:03

## 2017-08-25 RX ADMIN — DOCUSATE SODIUM 100 MG: 100 CAPSULE, LIQUID FILLED ORAL at 09:02

## 2017-08-25 RX ADMIN — OXYCODONE HYDROCHLORIDE AND ACETAMINOPHEN 2 TABLET: 5; 325 TABLET ORAL at 00:11

## 2017-08-25 NOTE — ANESTHESIA POSTPROCEDURE EVALUATION
"Patient: Hanh Ndiaye    Procedure Summary     Date Anesthesia Start Anesthesia Stop Room / Location    [id number]  [facility]   [facility]       Procedure Diagnosis Surgeon Provider     SECTION PRIMARY (N/A Abdomen) Breech presentation, antepartum; Methadone maintenance treatment affecting pregnancy in third trimester; Late prenatal care affecting pregnancy in third trimester MD Norman Tanner MD          Anesthesia Type: spinal  Last vitals  BP   150/90 (17)    Temp        Pulse   62 (17)   Resp        SpO2   98 % (17)      Post Anesthesia Care and Evaluation    Patient location during evaluation: PACU  Patient participation: complete - patient participated  Level of consciousness: sleepy but conscious  Pain score: 0  Pain management: adequate  Airway patency: patent  Anesthetic complications: No anesthetic complications    Cardiovascular status: acceptable  Respiratory status: acceptable  Hydration status: acceptable    Comments: /90  Pulse 62  Temp 36.3 °C (97.4 °F) (Oral)   Resp 16  Ht 65\" (165.1 cm)  Wt 158 lb (71.7 kg)  LMP 11/10/2016 (Within Weeks)  SpO2 98%  Breastfeeding? Yes  BMI 26.29 kg/m2        "

## 2017-08-26 RX ORDER — NALOXONE HCL 0.4 MG/ML
0.2 VIAL (ML) INJECTION
Status: DISCONTINUED | OUTPATIENT
Start: 2017-08-26 | End: 2017-08-27 | Stop reason: HOSPADM

## 2017-08-26 RX ADMIN — METHADONE HYDROCHLORIDE 70 MG: 10 TABLET ORAL at 08:59

## 2017-08-26 RX ADMIN — OXYCODONE HYDROCHLORIDE AND ACETAMINOPHEN 2 TABLET: 5; 325 TABLET ORAL at 14:30

## 2017-08-26 RX ADMIN — OXYCODONE HYDROCHLORIDE AND ACETAMINOPHEN 2 TABLET: 5; 325 TABLET ORAL at 09:09

## 2017-08-26 RX ADMIN — OXYCODONE HYDROCHLORIDE AND ACETAMINOPHEN 2 TABLET: 5; 325 TABLET ORAL at 18:26

## 2017-08-26 RX ADMIN — DOCUSATE SODIUM 100 MG: 100 CAPSULE, LIQUID FILLED ORAL at 18:26

## 2017-08-26 RX ADMIN — DOCUSATE SODIUM 100 MG: 100 CAPSULE, LIQUID FILLED ORAL at 08:58

## 2017-08-26 RX ADMIN — OXYCODONE HYDROCHLORIDE AND ACETAMINOPHEN 2 TABLET: 5; 325 TABLET ORAL at 22:50

## 2017-08-26 RX ADMIN — OXYCODONE HYDROCHLORIDE AND ACETAMINOPHEN 2 TABLET: 5; 325 TABLET ORAL at 03:27

## 2017-08-27 VITALS
TEMPERATURE: 98 F | BODY MASS INDEX: 26.33 KG/M2 | WEIGHT: 158 LBS | HEIGHT: 65 IN | RESPIRATION RATE: 16 BRPM | HEART RATE: 82 BPM | SYSTOLIC BLOOD PRESSURE: 115 MMHG | DIASTOLIC BLOOD PRESSURE: 77 MMHG | OXYGEN SATURATION: 97 %

## 2017-08-27 PROCEDURE — 99024 POSTOP FOLLOW-UP VISIT: CPT | Performed by: OBSTETRICS & GYNECOLOGY

## 2017-08-27 RX ORDER — OXYCODONE HYDROCHLORIDE AND ACETAMINOPHEN 5; 325 MG/1; MG/1
1-2 TABLET ORAL EVERY 4 HOURS PRN
Qty: 30 TABLET | Refills: 0 | Status: SHIPPED | OUTPATIENT
Start: 2017-08-27 | End: 2018-07-19

## 2017-08-27 RX ADMIN — OXYCODONE HYDROCHLORIDE AND ACETAMINOPHEN 2 TABLET: 5; 325 TABLET ORAL at 11:01

## 2017-08-27 RX ADMIN — OXYCODONE HYDROCHLORIDE AND ACETAMINOPHEN 2 TABLET: 5; 325 TABLET ORAL at 02:34

## 2017-08-27 RX ADMIN — OXYCODONE HYDROCHLORIDE AND ACETAMINOPHEN 2 TABLET: 5; 325 TABLET ORAL at 06:31

## 2017-08-27 RX ADMIN — METHADONE HYDROCHLORIDE 70 MG: 10 TABLET ORAL at 09:20

## 2017-08-30 ENCOUNTER — TELEPHONE (OUTPATIENT)
Dept: OBSTETRICS AND GYNECOLOGY | Facility: CLINIC | Age: 27
End: 2017-08-30

## 2017-08-30 NOTE — TELEPHONE ENCOUNTER
The patient is only 6 days out from her , so yes I would expect but still having some pain is to be expected.  It usually takes a good 2 weeks before patient start feeling back to normal.  If the patient is not having any redness around the incision, heavy drainage from the incision, or fevers, I do not suspect there is any serious problem.  If she begins to have issues with heavy bleeding or drainage from the incision, the incision coming open, a lot of redness around the incision, or fever more than 100.5 she should either call me or go back to the hospital to be evaluated.    ----- Message from Vanesa Joseph sent at 2017 12:05 PM EDT -----  Contact: Pt  Pt's  (Norman, on [employer]) called to schedule PO appt for pt.  Also inquiring if pt should still be in so much pain. States her incision burns, she has bruising just below her incision, and is extremly tender.  Denies redness & fever.  Pt still has rx pain medication, but is concerned about she is healing.    Pt # [phone]

## 2017-09-13 ENCOUNTER — DOCUMENTATION (OUTPATIENT)
Dept: SOCIAL WORK | Facility: HOSPITAL | Age: 27
End: 2017-09-13

## 2018-07-19 ENCOUNTER — OFFICE VISIT (OUTPATIENT)
Dept: FAMILY MEDICINE CLINIC | Facility: CLINIC | Age: 28
End: 2018-07-19

## 2018-07-19 VITALS
HEART RATE: 83 BPM | SYSTOLIC BLOOD PRESSURE: 132 MMHG | DIASTOLIC BLOOD PRESSURE: 80 MMHG | WEIGHT: 147 LBS | OXYGEN SATURATION: 96 % | BODY MASS INDEX: 24.46 KG/M2 | TEMPERATURE: 98.1 F

## 2018-07-19 DIAGNOSIS — N20.0 RENAL STONES: ICD-10-CM

## 2018-07-19 DIAGNOSIS — M54.9 CVA TENDERNESS: ICD-10-CM

## 2018-07-19 DIAGNOSIS — L70.0 ACNE VULGARIS: Primary | ICD-10-CM

## 2018-07-19 DIAGNOSIS — R30.0 DYSURIA: ICD-10-CM

## 2018-07-19 LAB
BILIRUB BLD-MCNC: NEGATIVE MG/DL
CLARITY, POC: CLEAR
COLOR UR: YELLOW
GLUCOSE UR STRIP-MCNC: NEGATIVE MG/DL
KETONES UR QL: NEGATIVE
LEUKOCYTE EST, POC: NEGATIVE
NITRITE UR-MCNC: NEGATIVE MG/ML
PH UR: 5.5 [PH] (ref 5–8)
PROT UR STRIP-MCNC: NEGATIVE MG/DL
RBC # UR STRIP: NEGATIVE /UL
SP GR UR: 1.03 (ref 1–1.03)
UROBILINOGEN UR QL: NORMAL

## 2018-07-19 PROCEDURE — 99203 OFFICE O/P NEW LOW 30 MIN: CPT | Performed by: FAMILY MEDICINE

## 2018-07-19 PROCEDURE — 81003 URINALYSIS AUTO W/O SCOPE: CPT | Performed by: FAMILY MEDICINE

## 2018-07-19 RX ORDER — SPIRONOLACTONE 25 MG/1
25 TABLET ORAL DAILY
Qty: 30 TABLET | Refills: 5 | Status: SHIPPED | OUTPATIENT
Start: 2018-07-19 | End: 2018-10-29 | Stop reason: SINTOL

## 2018-07-19 RX ORDER — MINOCYCLINE HYDROCHLORIDE 100 MG/1
100 CAPSULE ORAL 2 TIMES DAILY
Qty: 60 CAPSULE | Refills: 1 | Status: SHIPPED | OUTPATIENT
Start: 2018-07-19 | End: 2018-10-18 | Stop reason: SDUPTHER

## 2018-07-19 RX ORDER — ERYTHROMYCIN AND BENZOYL PEROXIDE 30; 50 MG/G; MG/G
GEL TOPICAL NIGHTLY
Qty: 46.6 G | Refills: 5 | Status: SHIPPED | OUTPATIENT
Start: 2018-07-19 | End: 2018-10-29

## 2018-07-19 NOTE — PROGRESS NOTES
Subjective   Hanh Ndiaye is a 28 y.o. female. Presents today for   Chief Complaint   Patient presents with   • Establish Care     severe acne.  Having kidney pain when she awakes every day.   New patient here to establish care and treatment for acne;  Currently going to methadone clinic.      Rash   This is a chronic problem. The current episode started more than 1 year ago. The problem has been waxing and waning since onset. The affected locations include the face. The rash is characterized by redness (pustules, pimples). She was exposed to nothing. Pertinent negatives include no fever, shortness of breath or vomiting. (Had acne in HS, recurred after had son.) Treatments tried: OTC proactive. The treatment provided no relief.   Back Pain   This is a new (Hurts in am when wakes up;  1 hour after urinates resolves.) problem. The current episode started more than 1 month ago. The problem occurs intermittently. The problem has been waxing and waning since onset. The pain is present in the thoracic spine (b/l cva). The quality of the pain is described as cramping. The pain does not radiate. The pain is mild. Stiffness is present in the morning. Pertinent negatives include no abdominal pain, bladder incontinence, bowel incontinence, chest pain, dysuria, fever or pelvic pain. Treatments tried: goes away in am. The treatment provided moderate relief.       Review of Systems   Constitutional: Negative for fever.   Respiratory: Negative for shortness of breath.    Cardiovascular: Negative for chest pain.   Gastrointestinal: Negative for abdominal pain, bowel incontinence, nausea and vomiting.   Genitourinary: Negative for bladder incontinence, dysuria and pelvic pain.   Musculoskeletal: Positive for back pain.   Skin: Positive for rash.   Neurological: Negative for syncope.       Patient Active Problem List   Diagnosis   • Encounter for supervision of normal first pregnancy in third trimester   • Late prenatal care  affecting pregnancy in second trimester   • Methadone maintenance treatment affecting pregnancy in third trimester (CMS/HCC)   • Tobacco smoking affecting pregnancy in third trimester   • Breech presentation   • Pregnancy   • S/P primary low transverse      Past Medical History:   Diagnosis Date   • Asthma    • Kidney stone    • Migraine    • Substance abuse      Past Surgical History:   Procedure Laterality Date   • APPENDECTOMY     •  SECTION N/A 2017    Procedure:  SECTION PRIMARY;  Surgeon: Isma Lerner MD;  Location: [facility] LABOR DELIVERY;  Service:      Family History   Problem Relation Age of Onset   • COPD Mother         heavy smoker   • Colon cancer Maternal Grandmother         brain mets   • No Known Problems Father         Did not know or his side of family   • Stroke Maternal Grandfather    • Diabetes Maternal Aunt        Social History     Social History   • Marital status:      Social History Main Topics   • Smoking status: Former Smoker     Packs/day: 1.00     Types: Cigarettes     Quit date: 2017   • Smokeless tobacco: Never Used   • Drug use: Yes     Types: Hydrocodone      Comment: Former opioid narcotic abuse - none since    • Sexual activity: Yes     Partners: Male     Other Topics Concern   • Not on file       Allergies   Allergen Reactions   • Ibuprofen Anaphylaxis     Throat swells       Current Outpatient Prescriptions on File Prior to Visit   Medication Sig Dispense Refill   • diphenhydrAMINE (BENADRYL) 25 mg capsule Take 25 mg by mouth Every 6 (Six) Hours As Needed for Allergies.     • methadone (DOLOPHINE) 10 MG tablet Take 70 mg by mouth Daily ,      • PRENATAL GUMMY VITAMIN Chew 1 each Daily.     • [DISCONTINUED] oxyCODONE-acetaminophen (PERCOCET) 5-325 MG per tablet Take 1-2 tablets by mouth Every 4 (Four) Hours As Needed for Severe Pain . 30 tablet 0     No current facility-administered medications on file prior to visit.         Objective   Vitals:    07/19/18 0929   BP: 132/80   Pulse: 83   Temp: 98.1 °F (36.7 °C)   SpO2: 96%   Weight: 66.7 kg (147 lb)       Physical Exam   Constitutional: She appears well-developed and well-nourished.   HENT:   Head: Normocephalic and atraumatic.   Neck: Neck supple. No JVD present. No thyromegaly present.   Cardiovascular: Normal rate, regular rhythm and normal heart sounds.  Exam reveals no gallop and no friction rub.    No murmur heard.  Pulmonary/Chest: Effort normal and breath sounds normal. No respiratory distress. She has no wheezes. She has no rales.   Abdominal: Soft. Bowel sounds are normal. She exhibits no distension. There is no tenderness. There is no rebound, no guarding, no CVA tenderness, no tenderness at McBurney's point and negative Marlow's sign.   Musculoskeletal: She exhibits no edema.   Neurological: She is alert.   Skin: Skin is warm and dry. Rash noted. Rash is papular.   Acne over face, masked by make-up.  Has papules, comedomes and pustules over back, arms and face.   Psychiatric: She has a normal mood and affect. Her behavior is normal.   Nursing note and vitals reviewed.    POC Urinalysis Dipstick, Automated   Order: [id number]   Status:  Final result   Visible to patient:  No (Not Released) Dx:  Dysuria    Ref Range & Units 09:47   Color Yellow, Straw, Dark Yellow, [place] Yellow    Clarity, UA Clear Clear    Specific Gravity  1.005 - 1.030 1.030    pH, Urine 5.0 - 8.0 5.5    Leukocytes Negative Negative    Nitrite, UA Negative Negative    Protein, POC Negative mg/dL Negative    Glucose, UA Negative, 1000 mg/dL (3+) mg/dL Negative    Ketones, UA Negative Negative    Urobilinogen, UA Normal Normal    Bilirubin Negative Negative    Blood, UA Negative Negative    Resulting Agency  [facility]      Specimen Collected: 07/19/18 09:47 Last Resulted: 07/19/18 09:47                Assessment/Plan   Hanh was seen today for establish care.    Diagnoses and all  orders for this visit:    Acne vulgaris  -     benzoyl peroxide-erythromycin (BENZAMYCIN) 5-3 % gel; Apply  topically Every Night.  -     spironolactone (ALDACTONE) 25 MG tablet; Take 1 tablet by mouth Daily.  -     minocycline (MINOCIN) 100 MG capsule; Take 1 capsule by mouth 2 (Two) Times a Day.    Dysuria  -     POC Urinalysis Dipstick, Automated    CVA tenderness  -     CBC & Differential  -     Comprehensive Metabolic Panel  -     Urine Culture - Urine, Urine, Clean Catch    Renal stones  -     CBC & Differential  -     Comprehensive Metabolic Panel  -     Urine Culture - Urine, Urine, Clean Catch    -urine clear, send cx and check u/s for CVA tendernes;  Reports hx of kidney stones, but pain is only in am, likely cramping due to need to urinate  -acne - use only dove sensitive skin bar soap;  Will start spironolactone for hormonal impact as may help;  Course of minocycline and will start topical.  Use OTC salicylic acid wash daily.         -Follow up: 3 months and prn

## 2018-07-21 LAB
ALBUMIN SERPL-MCNC: 4.7 G/DL (ref 3.5–5.2)
ALBUMIN/GLOB SERPL: 1.9 G/DL
ALP SERPL-CCNC: 52 U/L (ref 39–117)
ALT SERPL-CCNC: 24 U/L (ref 1–33)
AST SERPL-CCNC: 22 U/L (ref 1–32)
BACTERIA UR CULT: NORMAL
BACTERIA UR CULT: NORMAL
BASOPHILS # BLD AUTO: 0.02 10*3/MM3 (ref 0–0.2)
BASOPHILS NFR BLD AUTO: 0.3 % (ref 0–1.5)
BILIRUB SERPL-MCNC: 0.2 MG/DL (ref 0.1–1.2)
BUN SERPL-MCNC: 11 MG/DL (ref 6–20)
BUN/CREAT SERPL: 13.9 (ref 7–25)
CALCIUM SERPL-MCNC: 9.8 MG/DL (ref 8.6–10.5)
CHLORIDE SERPL-SCNC: 101 MMOL/L (ref 98–107)
CO2 SERPL-SCNC: 25.2 MMOL/L (ref 22–29)
CREAT SERPL-MCNC: 0.79 MG/DL (ref 0.57–1)
EOSINOPHIL # BLD AUTO: 0.33 10*3/MM3 (ref 0–0.7)
EOSINOPHIL NFR BLD AUTO: 5.2 % (ref 0.3–6.2)
ERYTHROCYTE [DISTWIDTH] IN BLOOD BY AUTOMATED COUNT: 12.3 % (ref 11.7–13)
GLOBULIN SER CALC-MCNC: 2.5 GM/DL
GLUCOSE SERPL-MCNC: 90 MG/DL (ref 65–99)
HCT VFR BLD AUTO: 40.9 % (ref 35.6–45.5)
HGB BLD-MCNC: 13.6 G/DL (ref 11.9–15.5)
IMM GRANULOCYTES # BLD: 0.01 10*3/MM3 (ref 0–0.03)
IMM GRANULOCYTES NFR BLD: 0.2 % (ref 0–0.5)
LYMPHOCYTES # BLD AUTO: 2.94 10*3/MM3 (ref 0.9–4.8)
LYMPHOCYTES NFR BLD AUTO: 46.2 % (ref 19.6–45.3)
MCH RBC QN AUTO: 29.9 PG (ref 26.9–32)
MCHC RBC AUTO-ENTMCNC: 33.3 G/DL (ref 32.4–36.3)
MCV RBC AUTO: 89.9 FL (ref 80.5–98.2)
MONOCYTES # BLD AUTO: 0.45 10*3/MM3 (ref 0.2–1.2)
MONOCYTES NFR BLD AUTO: 7.1 % (ref 5–12)
NEUTROPHILS # BLD AUTO: 2.63 10*3/MM3 (ref 1.9–8.1)
NEUTROPHILS NFR BLD AUTO: 41.2 % (ref 42.7–76)
PLATELET # BLD AUTO: 255 10*3/MM3 (ref 140–500)
POTASSIUM SERPL-SCNC: 4.5 MMOL/L (ref 3.5–5.2)
PROT SERPL-MCNC: 7.2 G/DL (ref 6–8.5)
RBC # BLD AUTO: 4.55 10*6/MM3 (ref 3.9–5.2)
SODIUM SERPL-SCNC: 141 MMOL/L (ref 136–145)
WBC # BLD AUTO: 6.37 10*3/MM3 (ref 4.5–10.7)

## 2018-07-22 NOTE — PROGRESS NOTES
Call and mail copy of results to patient.  Blood counts, kidney and liver function normal  Urine culture is negative

## 2018-07-23 ENCOUNTER — TELEPHONE (OUTPATIENT)
Dept: FAMILY MEDICINE CLINIC | Facility: CLINIC | Age: 28
End: 2018-07-23

## 2018-07-23 NOTE — TELEPHONE ENCOUNTER
----- Message from Ruy Perkins DO sent at 7/22/2018  9:36 AM EDT -----  Call and mail copy of results to patient.  Blood counts, kidney and liver function normal  Urine culture is negative  Labs mailed to pt. easton

## 2018-10-18 DIAGNOSIS — L70.0 ACNE VULGARIS: ICD-10-CM

## 2018-10-18 RX ORDER — MINOCYCLINE HYDROCHLORIDE 100 MG/1
CAPSULE ORAL
Qty: 60 CAPSULE | Refills: 0 | Status: SHIPPED | OUTPATIENT
Start: 2018-10-18 | End: 2018-10-29

## 2018-10-29 ENCOUNTER — OFFICE VISIT (OUTPATIENT)
Dept: FAMILY MEDICINE CLINIC | Facility: CLINIC | Age: 28
End: 2018-10-29

## 2018-10-29 VITALS
OXYGEN SATURATION: 97 % | BODY MASS INDEX: 22.8 KG/M2 | HEART RATE: 96 BPM | TEMPERATURE: 98.5 F | DIASTOLIC BLOOD PRESSURE: 60 MMHG | SYSTOLIC BLOOD PRESSURE: 116 MMHG | WEIGHT: 137 LBS

## 2018-10-29 DIAGNOSIS — L70.0 ACNE VULGARIS: Primary | ICD-10-CM

## 2018-10-29 PROCEDURE — 99213 OFFICE O/P EST LOW 20 MIN: CPT | Performed by: FAMILY MEDICINE

## 2018-10-29 RX ORDER — DOXYCYCLINE 50 MG/1
CAPSULE ORAL
Qty: 60 CAPSULE | Refills: 5 | Status: ON HOLD | OUTPATIENT
Start: 2018-10-29 | End: 2019-10-28

## 2018-10-29 NOTE — PROGRESS NOTES
Subjective   Hanh Ndiaye is a 28 y.o. female. Presents today for   Chief Complaint   Patient presents with   • Follow-up     acne       Rash   This is a chronic (acne vulgaris) problem. The current episode started more than 1 year ago. The problem has been waxing and waning since onset. The affected locations include the face, right shoulder, left shoulder, chest, torso and back. The rash is characterized by redness and swelling (acne). She was exposed to nothing. (Face doing a little worse, back better;  Crm drying out skin so not using daily.  Spironolactone helped, but even at low dose couldn't tolerate as very nauseated.  Tried after food as well.    Had friend on medication took for few months and cleared up, ? Accutane.  D/w and interested in seeing dermatology.) Past treatments include antibiotics and antibiotic cream. The treatment provided mild relief.       Review of Systems   Skin: Positive for rash.       Patient Active Problem List   Diagnosis   • Encounter for supervision of normal first pregnancy in third trimester   • Late prenatal care affecting pregnancy in second trimester   • Methadone maintenance treatment affecting pregnancy in third trimester (CMS/[facility])   • Tobacco smoking affecting pregnancy in third trimester   • Breech presentation   • Pregnancy   • S/P primary low transverse        Social History     Social History   • Marital status:      Social History Main Topics   • Smoking status: Former Smoker     Packs/day: 1.00     Types: Cigarettes     Quit date: 2017   • Smokeless tobacco: Never Used   • Drug use: Yes     Types: Hydrocodone      Comment: Former opioid narcotic abuse - none since    • Sexual activity: Yes     Partners: Male     Other Topics Concern   • Not on file       Allergies   Allergen Reactions   • Ibuprofen Anaphylaxis     Throat swells       Current Outpatient Prescriptions on File Prior to Visit   Medication Sig Dispense Refill   • benzoyl  peroxide-erythromycin (BENZAMYCIN) 5-3 % gel Apply  topically Every Night. 46.6 g 5   • diphenhydrAMINE (BENADRYL) 25 mg capsule Take 25 mg by mouth Every 6 (Six) Hours As Needed for Allergies.     • methadone (DOLOPHINE) 10 MG tablet Take 70 mg by mouth Daily ,      • minocycline (MINOCIN,DYNACIN) 100 MG capsule TAKE ONE CAPSULE BY MOUTH TWICE DAILY 60 capsule 0   • PRENATAL GUMMY VITAMIN Chew 1 each Daily.     • [DISCONTINUED] spironolactone (ALDACTONE) 25 MG tablet Take 1 tablet by mouth Daily. 30 tablet 5     No current facility-administered medications on file prior to visit.        Objective   Vitals:    10/29/18 1256   BP: 116/60   Pulse: 96   Temp: 98.5 °F (36.9 °C)   SpO2: 97%   Weight: 62.1 kg (137 lb)       Physical Exam   Constitutional: She is oriented to person, place, and time. She appears well-developed and well-nourished.   Neurological: She is alert and oriented to person, place, and time.   Skin: Skin is warm and dry. Rash noted. Rash is papular, nodular and pustular.   Extensive scarring noted.   Psychiatric: She has a normal mood and affect. Her behavior is normal.   Nursing note and vitals reviewed.      Assessment/Plan   Hanh was seen today for follow-up.    Diagnoses and all orders for this visit:    Acne vulgaris  -     Ambulatory Referral to Dermatology  -     doxycycline (MONODOX) 50 MG capsule; 1 po bid        -will try compound crm with clinda, benzoyl peroxide, tretinoin 0.05% and aloe vera  -encouraged try 1/2 of spironolactone and see if helps  -d/w accutane need to be certified to Rx;  Will refer to dermatology;  Will try lower dose doxycycline.  -dove sensitive skin only to wash with.       -Follow up: 3 months and prn

## 2019-06-24 ENCOUNTER — INITIAL PRENATAL (OUTPATIENT)
Dept: OBSTETRICS AND GYNECOLOGY | Facility: CLINIC | Age: 29
End: 2019-06-24

## 2019-06-24 ENCOUNTER — PROCEDURE VISIT (OUTPATIENT)
Dept: OBSTETRICS AND GYNECOLOGY | Facility: CLINIC | Age: 29
End: 2019-06-24

## 2019-06-24 VITALS — DIASTOLIC BLOOD PRESSURE: 64 MMHG | WEIGHT: 150.6 LBS | SYSTOLIC BLOOD PRESSURE: 116 MMHG | BODY MASS INDEX: 25.06 KG/M2

## 2019-06-24 DIAGNOSIS — O09.32 INITIAL OBSTETRIC VISIT IN SECOND TRIMESTER: Primary | ICD-10-CM

## 2019-06-24 DIAGNOSIS — Z36.3 SCREENING, ANTENATAL, FOR MALFORMATION BY ULTRASOUND: Primary | ICD-10-CM

## 2019-06-24 PROCEDURE — 0501F PRENATAL FLOW SHEET: CPT | Performed by: OBSTETRICS & GYNECOLOGY

## 2019-06-24 PROCEDURE — 76805 OB US >/= 14 WKS SNGL FETUS: CPT | Performed by: OBSTETRICS & GYNECOLOGY

## 2019-06-24 NOTE — PROGRESS NOTES
CC: Initial obstetric visit    HPI: 29-year-old  3 para 1 presents at 21-4/7 weeks by her last menstrual period for an initial obstetric visit.  She reports that her period is regular predictable and certain.  We will use it for gestational dating.  Obstetric history is complicated by prior  delivery for breech presentation.  The patient would like to have a repeat  delivery at 39 weeks.  Also, the patient is a methadone user because of a history of opioid use.  This is managed by methadone clinic.    Review of systems    This is negative for nausea or vomiting.  It is negative for abdominal or pelvic pain.  It is negative for vaginal bleeding.  All other systems are reviewed and are negative    Assessment and plan    1.  Intrauterine pregnancy at 21-4/7 weeks  2.  Pregnancy related counseling was undertaken and questions were answered  3.  Methadone use.  Counseled.  The patient understands the risk of  abstinence syndrome.  She will continue to follow with her methadone clinic.  Currently, her dose is 70 mg.  4.  Counseled regarding genetic screening.  The patient declines this.  5.  Screening ultrasound was reviewed and discussed with the patient.  6.  1 hour glucose tolerance test at the next visit.

## 2019-06-25 LAB
ABO GROUP BLD: (no result)
BASOPHILS # BLD AUTO: 0 X10E3/UL (ref 0–0.2)
BASOPHILS NFR BLD AUTO: 0 %
BLD GP AB SCN SERPL QL: NEGATIVE
EOSINOPHIL # BLD AUTO: 0.2 X10E3/UL (ref 0–0.4)
EOSINOPHIL NFR BLD AUTO: 2 %
ERYTHROCYTE [DISTWIDTH] IN BLOOD BY AUTOMATED COUNT: 13 % (ref 12.3–15.4)
HBV SURFACE AG SERPL QL IA: NEGATIVE
HCT VFR BLD AUTO: 38.2 % (ref 34–46.6)
HCV AB S/CO SERPL IA: <0.1 S/CO RATIO (ref 0–0.9)
HGB BLD-MCNC: 12.9 G/DL (ref 11.1–15.9)
HIV 1+2 AB+HIV1 P24 AG SERPL QL IA: NON REACTIVE
IMM GRANULOCYTES # BLD AUTO: 0 X10E3/UL (ref 0–0.1)
IMM GRANULOCYTES NFR BLD AUTO: 0 %
LYMPHOCYTES # BLD AUTO: 2.1 X10E3/UL (ref 0.7–3.1)
LYMPHOCYTES NFR BLD AUTO: 23 %
MCH RBC QN AUTO: 30.1 PG (ref 26.6–33)
MCHC RBC AUTO-ENTMCNC: 33.8 G/DL (ref 31.5–35.7)
MCV RBC AUTO: 89 FL (ref 79–97)
MONOCYTES # BLD AUTO: 0.7 X10E3/UL (ref 0.1–0.9)
MONOCYTES NFR BLD AUTO: 8 %
NEUTROPHILS # BLD AUTO: 6.3 X10E3/UL (ref 1.4–7)
NEUTROPHILS NFR BLD AUTO: 67 %
PLATELET # BLD AUTO: 211 X10E3/UL (ref 150–450)
RBC # BLD AUTO: 4.28 X10E6/UL (ref 3.77–5.28)
RH BLD: POSITIVE
RPR SER QL: NON REACTIVE
RUBV IGG SERPL IA-ACNC: 2.57 INDEX
WBC # BLD AUTO: 9.3 X10E3/UL (ref 3.4–10.8)

## 2019-06-26 LAB
BACTERIA UR CULT: NO GROWTH
BACTERIA UR CULT: NORMAL

## 2019-06-27 LAB
CONV .: NORMAL
CYTOLOGIST CVX/VAG CYTO: NORMAL
CYTOLOGY CVX/VAG DOC CYTO: NORMAL
CYTOLOGY CVX/VAG DOC THIN PREP: NORMAL
DX ICD CODE: NORMAL
HIV 1 & 2 AB SER-IMP: NORMAL
Lab: NORMAL
OTHER STN SPEC: NORMAL
STAT OF ADQ CVX/VAG CYTO-IMP: NORMAL

## 2019-07-16 ENCOUNTER — TELEPHONE (OUTPATIENT)
Dept: OBSTETRICS AND GYNECOLOGY | Facility: CLINIC | Age: 29
End: 2019-07-16

## 2019-07-16 NOTE — TELEPHONE ENCOUNTER
Kaela says that she is apart of Maternity Support Program that patient attends she said she is here anytime up to patients 6 week postpartum checkup if there is any questions or she may need to be in touched with her number is [phone] ext:[phone]

## 2019-07-22 ENCOUNTER — ROUTINE PRENATAL (OUTPATIENT)
Dept: OBSTETRICS AND GYNECOLOGY | Facility: CLINIC | Age: 29
End: 2019-07-22

## 2019-07-22 VITALS — WEIGHT: 156.8 LBS | DIASTOLIC BLOOD PRESSURE: 67 MMHG | SYSTOLIC BLOOD PRESSURE: 118 MMHG | BODY MASS INDEX: 26.09 KG/M2

## 2019-07-22 DIAGNOSIS — Z3A.25 25 WEEKS GESTATION OF PREGNANCY: Primary | ICD-10-CM

## 2019-07-22 PROCEDURE — 0502F SUBSEQUENT PRENATAL CARE: CPT | Performed by: OBSTETRICS & GYNECOLOGY

## 2019-07-22 NOTE — PROGRESS NOTES
CC: 29-year-old  3 para 1 presents at 25-4/7 weeks for a scheduled obstetric visit.  She is feeling well.  Her baby is moving actively.    Assessment and plan    1.  Intrauterine pregnancy at 25-4/7 weeks  2.  1 hour glucose tolerance test today  3.  Prenatal labs were reviewed and discussed with the patient.

## 2019-07-23 LAB
BASOPHILS # BLD AUTO: 0.02 10*3/MM3 (ref 0–0.2)
BASOPHILS NFR BLD AUTO: 0.2 % (ref 0–1.5)
BLD GP AB SCN SERPL QL: NEGATIVE
EOSINOPHIL # BLD AUTO: 0.18 10*3/MM3 (ref 0–0.4)
EOSINOPHIL NFR BLD AUTO: 2.1 % (ref 0.3–6.2)
ERYTHROCYTE [DISTWIDTH] IN BLOOD BY AUTOMATED COUNT: 12.9 % (ref 12.3–15.4)
GLUCOSE 1H P 50 G GLC PO SERPL-MCNC: 94 MG/DL (ref 65–139)
HCT VFR BLD AUTO: 39.5 % (ref 34–46.6)
HGB BLD-MCNC: 12.8 G/DL (ref 12–15.9)
IMM GRANULOCYTES # BLD AUTO: 0.02 10*3/MM3 (ref 0–0.05)
IMM GRANULOCYTES NFR BLD AUTO: 0.2 % (ref 0–0.5)
LYMPHOCYTES # BLD AUTO: 2.53 10*3/MM3 (ref 0.7–3.1)
LYMPHOCYTES NFR BLD AUTO: 29.2 % (ref 19.6–45.3)
MCH RBC QN AUTO: 30.2 PG (ref 26.6–33)
MCHC RBC AUTO-ENTMCNC: 32.4 G/DL (ref 31.5–35.7)
MCV RBC AUTO: 93.2 FL (ref 79–97)
MONOCYTES # BLD AUTO: 0.78 10*3/MM3 (ref 0.1–0.9)
MONOCYTES NFR BLD AUTO: 9 % (ref 5–12)
NEUTROPHILS # BLD AUTO: 5.12 10*3/MM3 (ref 1.7–7)
NEUTROPHILS NFR BLD AUTO: 59.3 % (ref 42.7–76)
NRBC BLD AUTO-RTO: 0 /100 WBC (ref 0–0.2)
PLATELET # BLD AUTO: 210 10*3/MM3 (ref 140–450)
RBC # BLD AUTO: 4.24 10*6/MM3 (ref 3.77–5.28)
WBC # BLD AUTO: 8.65 10*3/MM3 (ref 3.4–10.8)

## 2019-07-24 ENCOUNTER — RESULTS ENCOUNTER (OUTPATIENT)
Dept: OBSTETRICS AND GYNECOLOGY | Facility: CLINIC | Age: 29
End: 2019-07-24

## 2019-07-24 DIAGNOSIS — O09.32 INITIAL OBSTETRIC VISIT IN SECOND TRIMESTER: ICD-10-CM

## 2019-08-22 ENCOUNTER — ROUTINE PRENATAL (OUTPATIENT)
Dept: OBSTETRICS AND GYNECOLOGY | Facility: CLINIC | Age: 29
End: 2019-08-22

## 2019-08-22 ENCOUNTER — PREP FOR SURGERY (OUTPATIENT)
Dept: OTHER | Facility: HOSPITAL | Age: 29
End: 2019-08-22

## 2019-08-22 VITALS — SYSTOLIC BLOOD PRESSURE: 118 MMHG | BODY MASS INDEX: 27.52 KG/M2 | DIASTOLIC BLOOD PRESSURE: 70 MMHG | WEIGHT: 165.4 LBS

## 2019-08-22 DIAGNOSIS — Z98.891 H/O CESAREAN SECTION: Primary | ICD-10-CM

## 2019-08-22 DIAGNOSIS — Z3A.30 30 WEEKS GESTATION OF PREGNANCY: Primary | ICD-10-CM

## 2019-08-22 PROCEDURE — 90471 IMMUNIZATION ADMIN: CPT | Performed by: OBSTETRICS & GYNECOLOGY

## 2019-08-22 PROCEDURE — 0502F SUBSEQUENT PRENATAL CARE: CPT | Performed by: OBSTETRICS & GYNECOLOGY

## 2019-08-22 PROCEDURE — 90715 TDAP VACCINE 7 YRS/> IM: CPT | Performed by: OBSTETRICS & GYNECOLOGY

## 2019-08-22 RX ORDER — SODIUM CHLORIDE 0.9 % (FLUSH) 0.9 %
3 SYRINGE (ML) INJECTION EVERY 12 HOURS SCHEDULED
Status: CANCELLED | OUTPATIENT
Start: 2019-08-22

## 2019-08-22 RX ORDER — MISOPROSTOL 200 UG/1
800 TABLET ORAL AS NEEDED
Status: CANCELLED | OUTPATIENT
Start: 2019-08-22

## 2019-08-22 RX ORDER — SODIUM CHLORIDE, SODIUM LACTATE, POTASSIUM CHLORIDE, CALCIUM CHLORIDE 600; 310; 30; 20 MG/100ML; MG/100ML; MG/100ML; MG/100ML
125 INJECTION, SOLUTION INTRAVENOUS CONTINUOUS
Status: CANCELLED | OUTPATIENT
Start: 2019-08-22

## 2019-08-22 RX ORDER — OXYTOCIN-SODIUM CHLORIDE 0.9% IV SOLN 30 UNIT/500ML 30-0.9/5 UT/ML-%
125 SOLUTION INTRAVENOUS CONTINUOUS PRN
Status: CANCELLED | OUTPATIENT
Start: 2019-08-22

## 2019-08-22 RX ORDER — SODIUM CHLORIDE 0.9 % (FLUSH) 0.9 %
3-10 SYRINGE (ML) INJECTION AS NEEDED
Status: CANCELLED | OUTPATIENT
Start: 2019-08-22

## 2019-08-22 RX ORDER — OXYTOCIN-SODIUM CHLORIDE 0.9% IV SOLN 30 UNIT/500ML 30-0.9/5 UT/ML-%
999 SOLUTION INTRAVENOUS ONCE
Status: CANCELLED | OUTPATIENT
Start: 2019-08-22 | End: 2019-08-22

## 2019-08-22 RX ORDER — OXYTOCIN-SODIUM CHLORIDE 0.9% IV SOLN 30 UNIT/500ML 30-0.9/5 UT/ML-%
250 SOLUTION INTRAVENOUS CONTINUOUS
Status: CANCELLED | OUTPATIENT
Start: 2019-08-22 | End: 2019-08-22

## 2019-08-22 RX ORDER — LIDOCAINE HYDROCHLORIDE 10 MG/ML
5 INJECTION, SOLUTION EPIDURAL; INFILTRATION; INTRACAUDAL; PERINEURAL AS NEEDED
Status: CANCELLED | OUTPATIENT
Start: 2019-08-22

## 2019-08-22 RX ORDER — METHYLERGONOVINE MALEATE 0.2 MG/ML
200 INJECTION INTRAVENOUS ONCE AS NEEDED
Status: CANCELLED | OUTPATIENT
Start: 2019-08-22

## 2019-08-22 RX ORDER — CARBOPROST TROMETHAMINE 250 UG/ML
250 INJECTION, SOLUTION INTRAMUSCULAR AS NEEDED
Status: CANCELLED | OUTPATIENT
Start: 2019-08-22

## 2019-08-22 NOTE — PROGRESS NOTES
CC: 29-year-old  3 para 1 presents at 30-0/7 weeks for scheduled obstetric visit.  Her baby is moving actively.  She has no complaints today.    Assessment and plan    1.  Intrauterine pregnancy at 30-0/7 weeks  2.  The patient has passed her 1 hour glucose tolerance test  3.  Counseled regarding ACOG recommendations for the Tdap vaccine in pregnancy.  The patient would like to proceed  4.  We discussed delivery.  The patient would like to have a repeat  delivery.  We can schedule this for 39 weeks.

## 2019-09-03 ENCOUNTER — PROCEDURE VISIT (OUTPATIENT)
Dept: OBSTETRICS AND GYNECOLOGY | Facility: CLINIC | Age: 29
End: 2019-09-03

## 2019-09-03 ENCOUNTER — ROUTINE PRENATAL (OUTPATIENT)
Dept: OBSTETRICS AND GYNECOLOGY | Facility: CLINIC | Age: 29
End: 2019-09-03

## 2019-09-03 VITALS — SYSTOLIC BLOOD PRESSURE: 116 MMHG | DIASTOLIC BLOOD PRESSURE: 64 MMHG | BODY MASS INDEX: 27.86 KG/M2 | WEIGHT: 167.4 LBS

## 2019-09-03 DIAGNOSIS — O28.8 AFI (AMNIOTIC FLUID INDEX) BORDERLINE LOW: Primary | ICD-10-CM

## 2019-09-03 DIAGNOSIS — Z3A.31 31 WEEKS GESTATION OF PREGNANCY: Primary | ICD-10-CM

## 2019-09-03 PROBLEM — Z98.891 H/O CESAREAN SECTION: Status: ACTIVE | Noted: 2019-09-03

## 2019-09-03 PROCEDURE — 76815 OB US LIMITED FETUS(S): CPT | Performed by: OBSTETRICS & GYNECOLOGY

## 2019-09-03 PROCEDURE — 0502F SUBSEQUENT PRENATAL CARE: CPT | Performed by: OBSTETRICS & GYNECOLOGY

## 2019-09-03 NOTE — PROGRESS NOTES
CC: Obstetric visit    HPI: 29-year-old  3 para 1 presents at 31-5/7 weeks for scheduled obstetric visit.  Her baby is moving actively.  She has no complaints today.    Assessment and plan    1.  Intrauterine pregnancy at 31-5/7 weeks  2.  History of prior  delivery.  Repeat  delivery is planned.  Patient plans to schedule this soon.  3.  History of oligohydramnios with the last pregnancy.  Counseled.  We will check an amniotic fluid index today.

## 2019-09-20 ENCOUNTER — ROUTINE PRENATAL (OUTPATIENT)
Dept: OBSTETRICS AND GYNECOLOGY | Facility: CLINIC | Age: 29
End: 2019-09-20

## 2019-09-20 VITALS — DIASTOLIC BLOOD PRESSURE: 72 MMHG | WEIGHT: 171.2 LBS | SYSTOLIC BLOOD PRESSURE: 118 MMHG | BODY MASS INDEX: 28.49 KG/M2

## 2019-09-20 DIAGNOSIS — Z3A.34 34 WEEKS GESTATION OF PREGNANCY: Primary | ICD-10-CM

## 2019-09-20 PROCEDURE — 0502F SUBSEQUENT PRENATAL CARE: CPT | Performed by: OBSTETRICS & GYNECOLOGY

## 2019-09-20 NOTE — PROGRESS NOTES
CC: Obstetric visit    HPI: 29-year-old  3 para 1 presents at 34-1/7 weeks for scheduled obstetric visit.  Her baby is moving actively.  She has no complaints today.    Assessment and plan    1.  Intrauterine pregnancy at 34-1/7 weeks  2.  History of a prior  delivery.  Counseled.  We plan a repeat  delivery at 39 weeks.  The patient plans to work with the office on scheduling today.  3.  Follow-up in 2 weeks for a group B strep culture

## 2019-10-03 ENCOUNTER — ROUTINE PRENATAL (OUTPATIENT)
Dept: OBSTETRICS AND GYNECOLOGY | Facility: CLINIC | Age: 29
End: 2019-10-03

## 2019-10-03 VITALS — WEIGHT: 172 LBS | DIASTOLIC BLOOD PRESSURE: 80 MMHG | BODY MASS INDEX: 28.62 KG/M2 | SYSTOLIC BLOOD PRESSURE: 135 MMHG

## 2019-10-03 DIAGNOSIS — Z34.93 NORMAL PREGNANCY, THIRD TRIMESTER: Primary | ICD-10-CM

## 2019-10-03 PROCEDURE — 0502F SUBSEQUENT PRENATAL CARE: CPT | Performed by: OBSTETRICS & GYNECOLOGY

## 2019-10-03 NOTE — PROGRESS NOTES
CC: Obstetric visit    HPI: 29-year-old  3 para 1 presents at 36-0/7 weeks for a scheduled obstetric visit.  Her baby is moving actively.  She has only rare contractions.  She is doing well overall.    Assessment and plan    1.  Intrauterine pregnancy at 36-0/7 weeks.  2.  Group B strep cultures performed.  Counseled regarding the rationale for ordering them.  Questions answered.  3.   delivery is planned for .  Labor warnings and rupture membrane warnings given

## 2019-10-07 LAB — B-HEM STREP SPEC QL CULT: NEGATIVE

## 2019-10-10 ENCOUNTER — ROUTINE PRENATAL (OUTPATIENT)
Dept: OBSTETRICS AND GYNECOLOGY | Facility: CLINIC | Age: 29
End: 2019-10-10

## 2019-10-10 ENCOUNTER — PROCEDURE VISIT (OUTPATIENT)
Dept: OBSTETRICS AND GYNECOLOGY | Facility: CLINIC | Age: 29
End: 2019-10-10

## 2019-10-10 VITALS — DIASTOLIC BLOOD PRESSURE: 84 MMHG | BODY MASS INDEX: 29.15 KG/M2 | SYSTOLIC BLOOD PRESSURE: 124 MMHG | WEIGHT: 175.2 LBS

## 2019-10-10 DIAGNOSIS — O26.853 SPOTTING AFFECTING PREGNANCY IN THIRD TRIMESTER: ICD-10-CM

## 2019-10-10 DIAGNOSIS — Z3A.37 37 WEEKS GESTATION OF PREGNANCY: Primary | ICD-10-CM

## 2019-10-10 DIAGNOSIS — Z98.891 H/O CESAREAN SECTION: Primary | ICD-10-CM

## 2019-10-10 PROCEDURE — 0502F SUBSEQUENT PRENATAL CARE: CPT | Performed by: OBSTETRICS & GYNECOLOGY

## 2019-10-10 PROCEDURE — 76819 FETAL BIOPHYS PROFIL W/O NST: CPT | Performed by: OBSTETRICS & GYNECOLOGY

## 2019-10-10 NOTE — PROGRESS NOTES
CC: Obstetric visit    HPI: 29-year-old  3 para 1 presents at 37-0/7 weeks for a scheduled obstetric visit.  She reports active fetal movement.  She had an episode of contractions on Tuesday which were accompanied by vaginal bleeding.  She reports the bleeding was light.  She equated it with bloody show.  Since that time, no contractions and no bleeding.    Assessment and plan    1.  Intrauterine pregnancy at 37-0/7 weeks.  2.  Episode of contractions with bloody show.  Counseled.  Labor warnings and rupture membrane warnings given.  No bleeding since Tuesday and no blood on exam today.  I recommend a biophysical profile to check the fetus and placenta.  The patient agrees.

## 2019-10-24 ENCOUNTER — ROUTINE PRENATAL (OUTPATIENT)
Dept: OBSTETRICS AND GYNECOLOGY | Facility: CLINIC | Age: 29
End: 2019-10-24

## 2019-10-24 VITALS — DIASTOLIC BLOOD PRESSURE: 67 MMHG | WEIGHT: 179.2 LBS | SYSTOLIC BLOOD PRESSURE: 120 MMHG | BODY MASS INDEX: 29.82 KG/M2

## 2019-10-24 DIAGNOSIS — Z3A.39 39 WEEKS GESTATION OF PREGNANCY: Primary | ICD-10-CM

## 2019-10-24 PROCEDURE — 0502F SUBSEQUENT PRENATAL CARE: CPT | Performed by: OBSTETRICS & GYNECOLOGY

## 2019-10-24 NOTE — PROGRESS NOTES
CC: Obstetric visit    HPI: 29-year-old  3 para 1 presents at 39-0/7 weeks for a scheduled obstetric visit.  Her baby is moving actively.  She has only rare contractions.    Assessment and plan    1.  Intrauterine pregnancy at 39-0/7 weeks  2.  Counseled regarding ACOG recommendations for the influenza vaccine in pregnancy.  The patient declines this.  3.   delivery is scheduled for Monday.

## 2019-10-28 ENCOUNTER — ANESTHESIA (OUTPATIENT)
Dept: LABOR AND DELIVERY | Facility: HOSPITAL | Age: 29
End: 2019-10-28

## 2019-10-28 ENCOUNTER — HOSPITAL ENCOUNTER (INPATIENT)
Facility: HOSPITAL | Age: 29
LOS: 3 days | Discharge: HOME | End: 2019-10-31
Attending: OBSTETRICS & GYNECOLOGY | Admitting: OBSTETRICS & GYNECOLOGY

## 2019-10-28 ENCOUNTER — ANESTHESIA EVENT (OUTPATIENT)
Dept: LABOR AND DELIVERY | Facility: HOSPITAL | Age: 29
End: 2019-10-28

## 2019-10-28 DIAGNOSIS — Z98.891 H/O CESAREAN SECTION: ICD-10-CM

## 2019-10-28 LAB
ABO GROUP BLD: NORMAL
ALBUMIN SERPL-MCNC: 3.3 G/DL (ref 3.5–5.2)
ALBUMIN/GLOB SERPL: 0.9 G/DL
ALP SERPL-CCNC: 127 U/L (ref 39–117)
ALT SERPL W P-5'-P-CCNC: 12 U/L (ref 1–33)
AMPHET+METHAMPHET UR QL: NEGATIVE
ANION GAP SERPL CALCULATED.3IONS-SCNC: 11.8 MMOL/L (ref 5–15)
AST SERPL-CCNC: 19 U/L (ref 1–32)
BARBITURATES UR QL SCN: NEGATIVE
BENZODIAZ UR QL SCN: NEGATIVE
BILIRUB SERPL-MCNC: <0.2 MG/DL (ref 0.2–1.2)
BLD GP AB SCN SERPL QL: NEGATIVE
BUN BLD-MCNC: 16 MG/DL (ref 6–20)
BUN/CREAT SERPL: 19.8 (ref 7–25)
CALCIUM SPEC-SCNC: 8.9 MG/DL (ref 8.6–10.5)
CANNABINOIDS SERPL QL: NEGATIVE
CHLORIDE SERPL-SCNC: 103 MMOL/L (ref 98–107)
CO2 SERPL-SCNC: 23.2 MMOL/L (ref 22–29)
COCAINE UR QL: NEGATIVE
CREAT BLD-MCNC: 0.81 MG/DL (ref 0.57–1)
DEPRECATED RDW RBC AUTO: 44.3 FL (ref 37–54)
ERYTHROCYTE [DISTWIDTH] IN BLOOD BY AUTOMATED COUNT: 14.2 % (ref 12.3–15.4)
EXPIRATION DATE: NORMAL
GFR SERPL CREATININE-BSD FRML MDRD: 84 ML/MIN/1.73
GLOBULIN UR ELPH-MCNC: 3.7 GM/DL
GLUCOSE BLD-MCNC: 80 MG/DL (ref 65–99)
HCT VFR BLD AUTO: 37.9 % (ref 34–46.6)
HGB BLD-MCNC: 12.6 G/DL (ref 12–15.9)
Lab: NORMAL
MCH RBC QN AUTO: 29 PG (ref 26.6–33)
MCHC RBC AUTO-ENTMCNC: 33.2 G/DL (ref 31.5–35.7)
MCV RBC AUTO: 87.1 FL (ref 79–97)
METHADONE UR QL SCN: POSITIVE
OPIATES UR QL: NEGATIVE
OXYCODONE UR QL SCN: NEGATIVE
PLATELET # BLD AUTO: 207 10*3/MM3 (ref 140–450)
PMV BLD AUTO: 12.1 FL (ref 6–12)
POTASSIUM BLD-SCNC: 4.3 MMOL/L (ref 3.5–5.2)
PROT SERPL-MCNC: 7 G/DL (ref 6–8.5)
PROT UR STRIP-MCNC: NEGATIVE MG/DL
RBC # BLD AUTO: 4.35 10*6/MM3 (ref 3.77–5.28)
RH BLD: POSITIVE
SODIUM BLD-SCNC: 138 MMOL/L (ref 136–145)
T&S EXPIRATION DATE: NORMAL
WBC NRBC COR # BLD: 7.84 10*3/MM3 (ref 3.4–10.8)

## 2019-10-28 PROCEDURE — 81002 URINALYSIS NONAUTO W/O SCOPE: CPT | Performed by: OBSTETRICS & GYNECOLOGY

## 2019-10-28 PROCEDURE — C9290 INJ, BUPIVACAINE LIPOSOME: HCPCS | Performed by: ANESTHESIOLOGY

## 2019-10-28 PROCEDURE — 25010000002 MORPHINE PER 10 MG: Performed by: NURSE ANESTHETIST, CERTIFIED REGISTERED

## 2019-10-28 PROCEDURE — 25010000002 FENTANYL CITRATE (PF) 100 MCG/2ML SOLUTION: Performed by: NURSE ANESTHETIST, CERTIFIED REGISTERED

## 2019-10-28 PROCEDURE — 25010000003 BUPIVACAINE LIPOSOME 1.3 % SUSPENSION: Performed by: ANESTHESIOLOGY

## 2019-10-28 PROCEDURE — 86901 BLOOD TYPING SEROLOGIC RH(D): CPT | Performed by: OBSTETRICS & GYNECOLOGY

## 2019-10-28 PROCEDURE — 80307 DRUG TEST PRSMV CHEM ANLYZR: CPT | Performed by: OBSTETRICS & GYNECOLOGY

## 2019-10-28 PROCEDURE — 85027 COMPLETE CBC AUTOMATED: CPT | Performed by: OBSTETRICS & GYNECOLOGY

## 2019-10-28 PROCEDURE — 86900 BLOOD TYPING SEROLOGIC ABO: CPT | Performed by: OBSTETRICS & GYNECOLOGY

## 2019-10-28 PROCEDURE — 25010000002 PROMETHAZINE PER 50 MG: Performed by: NURSE ANESTHETIST, CERTIFIED REGISTERED

## 2019-10-28 PROCEDURE — G0480 DRUG TEST DEF 1-7 CLASSES: HCPCS | Performed by: OBSTETRICS & GYNECOLOGY

## 2019-10-28 PROCEDURE — S0260 H&P FOR SURGERY: HCPCS | Performed by: OBSTETRICS & GYNECOLOGY

## 2019-10-28 PROCEDURE — 25010000003 CEFAZOLIN IN DEXTROSE 2-4 GM/100ML-% SOLUTION: Performed by: NURSE ANESTHETIST, CERTIFIED REGISTERED

## 2019-10-28 PROCEDURE — C1755 CATHETER, INTRASPINAL: HCPCS

## 2019-10-28 PROCEDURE — 59510 CESAREAN DELIVERY: CPT | Performed by: OBSTETRICS & GYNECOLOGY

## 2019-10-28 PROCEDURE — 80053 COMPREHEN METABOLIC PANEL: CPT | Performed by: OBSTETRICS & GYNECOLOGY

## 2019-10-28 PROCEDURE — 25010000003 CEFAZOLIN IN DEXTROSE 2-4 GM/100ML-% SOLUTION: Performed by: OBSTETRICS & GYNECOLOGY

## 2019-10-28 PROCEDURE — 25010000002 ONDANSETRON PER 1 MG: Performed by: ANESTHESIOLOGY

## 2019-10-28 PROCEDURE — 86850 RBC ANTIBODY SCREEN: CPT | Performed by: OBSTETRICS & GYNECOLOGY

## 2019-10-28 RX ORDER — MISOPROSTOL 200 UG/1
600 TABLET ORAL ONCE AS NEEDED
Status: DISCONTINUED | OUTPATIENT
Start: 2019-10-28 | End: 2019-10-31 | Stop reason: HOSPADM

## 2019-10-28 RX ORDER — METHADONE HYDROCHLORIDE 10 MG/1
80 TABLET ORAL DAILY
Status: DISCONTINUED | OUTPATIENT
Start: 2019-10-29 | End: 2019-10-31 | Stop reason: HOSPADM

## 2019-10-28 RX ORDER — ONDANSETRON 2 MG/ML
4 INJECTION INTRAMUSCULAR; INTRAVENOUS ONCE AS NEEDED
Status: DISCONTINUED | OUTPATIENT
Start: 2019-10-28 | End: 2019-10-31 | Stop reason: HOSPADM

## 2019-10-28 RX ORDER — MISOPROSTOL 200 UG/1
800 TABLET ORAL AS NEEDED
Status: DISCONTINUED | OUTPATIENT
Start: 2019-10-28 | End: 2019-10-28 | Stop reason: HOSPADM

## 2019-10-28 RX ORDER — HYDROMORPHONE HYDROCHLORIDE 1 MG/ML
0.5 INJECTION, SOLUTION INTRAMUSCULAR; INTRAVENOUS; SUBCUTANEOUS
Status: DISCONTINUED | OUTPATIENT
Start: 2019-10-28 | End: 2019-10-28 | Stop reason: HOSPADM

## 2019-10-28 RX ORDER — ONDANSETRON 4 MG/1
4 TABLET, FILM COATED ORAL EVERY 8 HOURS PRN
Status: DISCONTINUED | OUTPATIENT
Start: 2019-10-28 | End: 2019-10-31 | Stop reason: HOSPADM

## 2019-10-28 RX ORDER — MORPHINE SULFATE 2 MG/ML
2 INJECTION, SOLUTION INTRAMUSCULAR; INTRAVENOUS
Status: ACTIVE | OUTPATIENT
Start: 2019-10-28 | End: 2019-10-29

## 2019-10-28 RX ORDER — SODIUM CHLORIDE 0.9 % (FLUSH) 0.9 %
3 SYRINGE (ML) INJECTION EVERY 12 HOURS SCHEDULED
Status: DISCONTINUED | OUTPATIENT
Start: 2019-10-28 | End: 2019-10-28 | Stop reason: HOSPADM

## 2019-10-28 RX ORDER — OXYTOCIN-SODIUM CHLORIDE 0.9% IV SOLN 30 UNIT/500ML 30-0.9/5 UT/ML-%
250 SOLUTION INTRAVENOUS CONTINUOUS
Status: ACTIVE | OUTPATIENT
Start: 2019-10-28 | End: 2019-10-28

## 2019-10-28 RX ORDER — DIPHENHYDRAMINE HYDROCHLORIDE 50 MG/ML
25 INJECTION INTRAMUSCULAR; INTRAVENOUS EVERY 4 HOURS PRN
Status: DISCONTINUED | OUTPATIENT
Start: 2019-10-28 | End: 2019-10-31 | Stop reason: HOSPADM

## 2019-10-28 RX ORDER — LIDOCAINE HYDROCHLORIDE 10 MG/ML
5 INJECTION, SOLUTION EPIDURAL; INFILTRATION; INTRACAUDAL; PERINEURAL AS NEEDED
Status: DISCONTINUED | OUTPATIENT
Start: 2019-10-28 | End: 2019-10-28 | Stop reason: HOSPADM

## 2019-10-28 RX ORDER — ACETAMINOPHEN 500 MG
1000 TABLET ORAL ONCE
Status: DISCONTINUED | OUTPATIENT
Start: 2019-10-28 | End: 2019-10-28 | Stop reason: HOSPADM

## 2019-10-28 RX ORDER — PHYTONADIONE 2 MG/ML
INJECTION, EMULSION INTRAMUSCULAR; INTRAVENOUS; SUBCUTANEOUS
Status: DISPENSED
Start: 2019-10-28 | End: 2019-10-28

## 2019-10-28 RX ORDER — OXYCODONE HYDROCHLORIDE AND ACETAMINOPHEN 5; 325 MG/1; MG/1
1 TABLET ORAL EVERY 4 HOURS PRN
Status: DISCONTINUED | OUTPATIENT
Start: 2019-10-28 | End: 2019-10-29

## 2019-10-28 RX ORDER — MORPHINE SULFATE 1 MG/ML
INJECTION, SOLUTION EPIDURAL; INTRATHECAL; INTRAVENOUS AS NEEDED
Status: DISCONTINUED | OUTPATIENT
Start: 2019-10-28 | End: 2019-10-28 | Stop reason: SURG

## 2019-10-28 RX ORDER — SODIUM CHLORIDE 0.9 % (FLUSH) 0.9 %
3-10 SYRINGE (ML) INJECTION AS NEEDED
Status: DISCONTINUED | OUTPATIENT
Start: 2019-10-28 | End: 2019-10-28 | Stop reason: HOSPADM

## 2019-10-28 RX ORDER — CEFAZOLIN SODIUM 2 G/100ML
2 INJECTION, SOLUTION INTRAVENOUS ONCE
Status: COMPLETED | OUTPATIENT
Start: 2019-10-28 | End: 2019-10-28

## 2019-10-28 RX ORDER — OXYTOCIN-SODIUM CHLORIDE 0.9% IV SOLN 30 UNIT/500ML 30-0.9/5 UT/ML-%
125 SOLUTION INTRAVENOUS CONTINUOUS PRN
Status: DISCONTINUED | OUTPATIENT
Start: 2019-10-28 | End: 2019-10-31 | Stop reason: HOSPADM

## 2019-10-28 RX ORDER — DOCUSATE SODIUM 100 MG/1
100 CAPSULE, LIQUID FILLED ORAL 2 TIMES DAILY
Status: DISCONTINUED | OUTPATIENT
Start: 2019-10-28 | End: 2019-10-31 | Stop reason: HOSPADM

## 2019-10-28 RX ORDER — OXYTOCIN-SODIUM CHLORIDE 0.9% IV SOLN 30 UNIT/500ML 30-0.9/5 UT/ML-%
250 SOLUTION INTRAVENOUS CONTINUOUS
Status: DISPENSED | OUTPATIENT
Start: 2019-10-28 | End: 2019-10-28

## 2019-10-28 RX ORDER — PROMETHAZINE HYDROCHLORIDE 25 MG/ML
INJECTION, SOLUTION INTRAMUSCULAR; INTRAVENOUS AS NEEDED
Status: DISCONTINUED | OUTPATIENT
Start: 2019-10-28 | End: 2019-10-28 | Stop reason: SURG

## 2019-10-28 RX ORDER — DIPHENHYDRAMINE HCL 25 MG
25 CAPSULE ORAL EVERY 4 HOURS PRN
Status: DISCONTINUED | OUTPATIENT
Start: 2019-10-28 | End: 2019-10-31 | Stop reason: HOSPADM

## 2019-10-28 RX ORDER — FENTANYL CITRATE 50 UG/ML
INJECTION, SOLUTION INTRAMUSCULAR; INTRAVENOUS AS NEEDED
Status: DISCONTINUED | OUTPATIENT
Start: 2019-10-28 | End: 2019-10-28 | Stop reason: SURG

## 2019-10-28 RX ORDER — CARBOPROST TROMETHAMINE 250 UG/ML
250 INJECTION, SOLUTION INTRAMUSCULAR AS NEEDED
Status: DISCONTINUED | OUTPATIENT
Start: 2019-10-28 | End: 2019-10-28 | Stop reason: HOSPADM

## 2019-10-28 RX ORDER — MORPHINE SULFATE 1 MG/ML
INJECTION, SOLUTION EPIDURAL; INTRATHECAL; INTRAVENOUS
Status: COMPLETED | OUTPATIENT
Start: 2019-10-28 | End: 2019-10-28

## 2019-10-28 RX ORDER — ACETAMINOPHEN 500 MG
1000 TABLET ORAL ONCE
Status: COMPLETED | OUTPATIENT
Start: 2019-10-28 | End: 2019-10-28

## 2019-10-28 RX ORDER — FAMOTIDINE 10 MG/ML
20 INJECTION, SOLUTION INTRAVENOUS ONCE AS NEEDED
Status: COMPLETED | OUTPATIENT
Start: 2019-10-28 | End: 2019-10-28

## 2019-10-28 RX ORDER — MORPHINE SULFATE 1 MG/ML
INJECTION, SOLUTION EPIDURAL; INTRATHECAL; INTRAVENOUS
Status: COMPLETED
Start: 2019-10-28 | End: 2019-10-28

## 2019-10-28 RX ORDER — OXYTOCIN-SODIUM CHLORIDE 0.9% IV SOLN 30 UNIT/500ML 30-0.9/5 UT/ML-%
999 SOLUTION INTRAVENOUS ONCE
Status: COMPLETED | OUTPATIENT
Start: 2019-10-28 | End: 2019-10-28

## 2019-10-28 RX ORDER — OXYTOCIN-SODIUM CHLORIDE 0.9% IV SOLN 30 UNIT/500ML 30-0.9/5 UT/ML-%
999 SOLUTION INTRAVENOUS ONCE
Status: DISCONTINUED | OUTPATIENT
Start: 2019-10-28 | End: 2019-10-31 | Stop reason: HOSPADM

## 2019-10-28 RX ORDER — LANOLIN
CREAM (ML) TOPICAL
Status: DISCONTINUED | OUTPATIENT
Start: 2019-10-28 | End: 2019-10-31 | Stop reason: HOSPADM

## 2019-10-28 RX ORDER — BUPIVACAINE HYDROCHLORIDE AND EPINEPHRINE 2.5; 5 MG/ML; UG/ML
INJECTION, SOLUTION EPIDURAL; INFILTRATION; INTRACAUDAL; PERINEURAL
Status: DISCONTINUED | OUTPATIENT
Start: 2019-10-28 | End: 2019-10-28 | Stop reason: SURG

## 2019-10-28 RX ORDER — METHYLERGONOVINE MALEATE 0.2 MG/ML
200 INJECTION INTRAVENOUS ONCE AS NEEDED
Status: DISCONTINUED | OUTPATIENT
Start: 2019-10-28 | End: 2019-10-28 | Stop reason: HOSPADM

## 2019-10-28 RX ORDER — FAMOTIDINE 10 MG/ML
20 INJECTION, SOLUTION INTRAVENOUS ONCE AS NEEDED
Status: DISCONTINUED | OUTPATIENT
Start: 2019-10-28 | End: 2019-10-28 | Stop reason: HOSPADM

## 2019-10-28 RX ORDER — SODIUM CHLORIDE, SODIUM LACTATE, POTASSIUM CHLORIDE, CALCIUM CHLORIDE 600; 310; 30; 20 MG/100ML; MG/100ML; MG/100ML; MG/100ML
125 INJECTION, SOLUTION INTRAVENOUS CONTINUOUS
Status: DISCONTINUED | OUTPATIENT
Start: 2019-10-28 | End: 2019-10-28

## 2019-10-28 RX ORDER — OXYCODONE HYDROCHLORIDE AND ACETAMINOPHEN 5; 325 MG/1; MG/1
2 TABLET ORAL EVERY 4 HOURS PRN
Status: DISCONTINUED | OUTPATIENT
Start: 2019-10-28 | End: 2019-10-29

## 2019-10-28 RX ORDER — FENTANYL CITRATE 50 UG/ML
INJECTION, SOLUTION INTRAMUSCULAR; INTRAVENOUS
Status: COMPLETED | OUTPATIENT
Start: 2019-10-28 | End: 2019-10-28

## 2019-10-28 RX ORDER — NALOXONE HCL 0.4 MG/ML
0.2 VIAL (ML) INJECTION
Status: DISCONTINUED | OUTPATIENT
Start: 2019-10-28 | End: 2019-10-31 | Stop reason: HOSPADM

## 2019-10-28 RX ORDER — CEFAZOLIN SODIUM 2 G/100ML
INJECTION, SOLUTION INTRAVENOUS AS NEEDED
Status: DISCONTINUED | OUTPATIENT
Start: 2019-10-28 | End: 2019-10-28 | Stop reason: SURG

## 2019-10-28 RX ORDER — HYDROXYZINE 50 MG/1
50 TABLET, FILM COATED ORAL EVERY 6 HOURS PRN
Status: DISCONTINUED | OUTPATIENT
Start: 2019-10-28 | End: 2019-10-31 | Stop reason: HOSPADM

## 2019-10-28 RX ORDER — ERYTHROMYCIN 5 MG/G
OINTMENT OPHTHALMIC
Status: DISPENSED
Start: 2019-10-28 | End: 2019-10-28

## 2019-10-28 RX ORDER — BUPIVACAINE HYDROCHLORIDE 7.5 MG/ML
INJECTION, SOLUTION EPIDURAL; RETROBULBAR
Status: COMPLETED | OUTPATIENT
Start: 2019-10-28 | End: 2019-10-28

## 2019-10-28 RX ORDER — OXYTOCIN-SODIUM CHLORIDE 0.9% IV SOLN 30 UNIT/500ML 30-0.9/5 UT/ML-%
125 SOLUTION INTRAVENOUS CONTINUOUS PRN
Status: COMPLETED | OUTPATIENT
Start: 2019-10-28 | End: 2019-10-28

## 2019-10-28 RX ORDER — ONDANSETRON 2 MG/ML
4 INJECTION INTRAMUSCULAR; INTRAVENOUS ONCE AS NEEDED
Status: DISCONTINUED | OUTPATIENT
Start: 2019-10-28 | End: 2019-10-28 | Stop reason: HOSPADM

## 2019-10-28 RX ORDER — ONDANSETRON 2 MG/ML
4 INJECTION INTRAMUSCULAR; INTRAVENOUS ONCE AS NEEDED
Status: COMPLETED | OUTPATIENT
Start: 2019-10-28 | End: 2019-10-28

## 2019-10-28 RX ADMIN — ONDANSETRON 4 MG: 2 INJECTION INTRAMUSCULAR; INTRAVENOUS at 10:15

## 2019-10-28 RX ADMIN — MORPHINE SULFATE 1 MG: 1 INJECTION EPIDURAL; INTRATHECAL; INTRAVENOUS at 11:19

## 2019-10-28 RX ADMIN — DOCUSATE SODIUM 100 MG: 100 CAPSULE, LIQUID FILLED ORAL at 22:04

## 2019-10-28 RX ADMIN — FAMOTIDINE 20 MG: 10 INJECTION INTRAVENOUS at 10:13

## 2019-10-28 RX ADMIN — MORPHINE SULFATE 1 MG: 1 INJECTION EPIDURAL; INTRATHECAL; INTRAVENOUS at 11:05

## 2019-10-28 RX ADMIN — ACETAMINOPHEN 1000 MG: 500 TABLET, FILM COATED ORAL at 10:12

## 2019-10-28 RX ADMIN — SODIUM CHLORIDE, POTASSIUM CHLORIDE, SODIUM LACTATE AND CALCIUM CHLORIDE 125 ML/HR: 600; 310; 30; 20 INJECTION, SOLUTION INTRAVENOUS at 10:01

## 2019-10-28 RX ADMIN — OXYTOCIN 999 ML/HR: 10 INJECTION INTRAVENOUS at 10:55

## 2019-10-28 RX ADMIN — PROMETHAZINE HYDROCHLORIDE 12.5 MG: 25 INJECTION INTRAMUSCULAR; INTRAVENOUS at 11:19

## 2019-10-28 RX ADMIN — FENTANYL CITRATE 15 MCG: 50 INJECTION INTRAMUSCULAR; INTRAVENOUS at 10:42

## 2019-10-28 RX ADMIN — MORPHINE SULFATE 200 MCG: 1 INJECTION EPIDURAL; INTRATHECAL; INTRAVENOUS at 10:42

## 2019-10-28 RX ADMIN — PROMETHAZINE HYDROCHLORIDE 12.5 MG: 25 INJECTION INTRAMUSCULAR; INTRAVENOUS at 11:15

## 2019-10-28 RX ADMIN — CEFAZOLIN SODIUM 2 G: 2 INJECTION, SOLUTION INTRAVENOUS at 10:45

## 2019-10-28 RX ADMIN — SODIUM CHLORIDE, POTASSIUM CHLORIDE, SODIUM LACTATE AND CALCIUM CHLORIDE 1000 ML: 600; 310; 30; 20 INJECTION, SOLUTION INTRAVENOUS at 08:30

## 2019-10-28 RX ADMIN — OXYCODONE AND ACETAMINOPHEN 2 TABLET: 5; 325 TABLET ORAL at 13:37

## 2019-10-28 RX ADMIN — BUPIVACAINE HYDROCHLORIDE 2 ML: 7.5 INJECTION, SOLUTION EPIDURAL; RETROBULBAR at 10:42

## 2019-10-28 RX ADMIN — BUPIVACAINE 20 ML: 13.3 INJECTION, SUSPENSION, LIPOSOMAL INFILTRATION at 12:16

## 2019-10-28 RX ADMIN — Medication: at 17:57

## 2019-10-28 RX ADMIN — BUPIVACAINE HYDROCHLORIDE AND EPINEPHRINE 20 ML: 2.5; 5 INJECTION, SOLUTION EPIDURAL; INFILTRATION; INTRACAUDAL; PERINEURAL at 12:16

## 2019-10-28 RX ADMIN — CEFAZOLIN SODIUM 2 G: 2 INJECTION, SOLUTION INTRAVENOUS at 10:17

## 2019-10-28 RX ADMIN — SODIUM CHLORIDE, POTASSIUM CHLORIDE, SODIUM LACTATE AND CALCIUM CHLORIDE: 600; 310; 30; 20 INJECTION, SOLUTION INTRAVENOUS at 11:31

## 2019-10-28 RX ADMIN — OXYCODONE AND ACETAMINOPHEN 2 TABLET: 5; 325 TABLET ORAL at 22:04

## 2019-10-28 RX ADMIN — MORPHINE SULFATE 1 MG: 1 INJECTION EPIDURAL; INTRATHECAL; INTRAVENOUS at 11:00

## 2019-10-28 RX ADMIN — OXYTOCIN 125 ML/HR: 10 INJECTION INTRAVENOUS at 12:26

## 2019-10-28 RX ADMIN — FENTANYL CITRATE 25 MCG: 50 INJECTION INTRAMUSCULAR; INTRAVENOUS at 11:16

## 2019-10-28 RX ADMIN — FENTANYL CITRATE 25 MCG: 50 INJECTION INTRAMUSCULAR; INTRAVENOUS at 11:11

## 2019-10-28 RX ADMIN — OXYCODONE AND ACETAMINOPHEN 2 TABLET: 5; 325 TABLET ORAL at 17:57

## 2019-10-28 NOTE — ANESTHESIA PROCEDURE NOTES
Spinal Block      Patient location during procedure: OR  Stop Time: 10/28/2019 10:36 AM  Indication:at surgeon's request and procedure for pain  Performed By  Anesthesiologist: Lyndsay Otoole MD  Preanesthetic Checklist  Completed: patient identified, site marked, surgical consent, pre-op evaluation, timeout performed, IV checked, risks and benefits discussed and monitors and equipment checked  Spinal Block Prep:  Patient Position:sitting  Sterile Tech:sterile barriers, cap and gloves  Prep:Chloraprep  Patient Monitoring:blood pressure monitoring and continuous pulse oximetry  Spinal Block Procedure  Approach:midline  Location:L3-L4  Needle Type:Sprotte  Needle Gauge:24 G  Placement of Spinal needle event:cerebrospinal fluid aspirated  Paresthesia: no  Fluid Appearance:clear  Medications: bupivacaine PF (MARCAINE) 0.75 % injection, 2 mL  fentaNYL citrate (PF) (SUBLIMAZE) injection, 15 mcg  Morphine PF injection, 200 mcg   Post Assessment  Patient Tolerance:patient tolerated the procedure well with no apparent complications  Complications no

## 2019-10-28 NOTE — ANESTHESIA POSTPROCEDURE EVALUATION
Patient: Hanh Ndiaye    Procedure Summary     Date:  10/28/19 Room / Location:   [facility]  /  [facility]    Anesthesia Start:  1029 Anesthesia Stop:      Procedure:   SECTION REPEAT (N/A Abdomen) Diagnosis:       H/O  section      (H/O  section [Z98.891])    Surgeon:  Aleksey Hill MD Provider:  Lyndsay Otoole MD    Anesthesia Type:  spinal ASA Status:  3          Anesthesia Type: spinal  Last vitals  BP   151/78 (10/28/19 1254)   Temp   36.6 °C (97.9 °F) (10/28/19 0820)   Pulse   61 (10/28/19 1302)   Resp   16 (10/28/19 1222)     SpO2   98 % (10/28/19 1302)     Post Anesthesia Care and Evaluation    Patient location during evaluation: bedside  Patient participation: complete - patient participated  Level of consciousness: awake  Pain management: adequate  Airway patency: patent  Anesthetic complications: No anesthetic complications    Cardiovascular status: acceptable  Respiratory status: acceptable  Hydration status: acceptable

## 2019-10-28 NOTE — ANESTHESIA PROCEDURE NOTES
Peripheral Block      Patient location during procedure: holding area  Reason for block: at surgeon's request and post-op pain management  Performed by  Anesthesiologist: Lyndsay Otoole MD  Preanesthetic Checklist  Completed: patient identified, site marked, surgical consent, pre-op evaluation, timeout performed, IV checked, risks and benefits discussed and monitors and equipment checked  Prep:  Sterile barriers:gloves  Prep: ChloraPrep  Patient monitoring: continuous pulse oximetry, blood pressure monitoring and EKG  Procedure  Sedation:yes  Performed under: PNB  Guidance:ultrasound guided  Images:still images obtained, printed/placed on chart    Laterality:BilateralInjection Technique:single-shotNeedle Gauge:20 G      Medications Used: bupivacaine liposome (EXPAREL) 1.3 % injection, 20 mL  bupivacaine-EPINEPHrine PF (MARCAINE w/EPI) 0.25% -[id number] injection, 20 mL      Post Assessment  Injection Assessment: negative aspiration for heme, no paresthesia on injection and incremental injection  Patient Tolerance:comfortable throughout block  Complications:no

## 2019-10-28 NOTE — ANESTHESIA PREPROCEDURE EVALUATION
Anesthesia Evaluation                  Airway   Mallampati: I  TM distance: >3 FB  Neck ROM: full  No difficulty expected  Dental - normal exam     Pulmonary - normal exam   (+) asthma,   Cardiovascular - normal exam        Neuro/Psych  (+) headaches,     GI/Hepatic/Renal/Endo    (+)   renal disease stones,     Musculoskeletal     Abdominal  - normal exam    Bowel sounds: normal.   Substance History       Comment: On methyadone   OB/GYN    (+) Pregnant,         Other                        Anesthesia Plan    ASA 3     spinal     Anesthetic plan, all risks, benefits, and alternatives have been provided, discussed and informed consent has been obtained with: patient.

## 2019-10-29 LAB
BASOPHILS # BLD AUTO: 0.02 10*3/MM3 (ref 0–0.2)
BASOPHILS NFR BLD AUTO: 0.2 % (ref 0–1.5)
BUPRENORPHINE UR QL: NEGATIVE NG/ML
DEPRECATED RDW RBC AUTO: 45.8 FL (ref 37–54)
EOSINOPHIL # BLD AUTO: 0.06 10*3/MM3 (ref 0–0.4)
EOSINOPHIL NFR BLD AUTO: 0.6 % (ref 0.3–6.2)
ERYTHROCYTE [DISTWIDTH] IN BLOOD BY AUTOMATED COUNT: 14.5 % (ref 12.3–15.4)
HCT VFR BLD AUTO: 33.9 % (ref 34–46.6)
HGB BLD-MCNC: 11.1 G/DL (ref 12–15.9)
IMM GRANULOCYTES # BLD AUTO: 0.05 10*3/MM3 (ref 0–0.05)
IMM GRANULOCYTES NFR BLD AUTO: 0.5 % (ref 0–0.5)
LYMPHOCYTES # BLD AUTO: 1.58 10*3/MM3 (ref 0.7–3.1)
LYMPHOCYTES NFR BLD AUTO: 14.7 % (ref 19.6–45.3)
MCH RBC QN AUTO: 28.8 PG (ref 26.6–33)
MCHC RBC AUTO-ENTMCNC: 32.7 G/DL (ref 31.5–35.7)
MCV RBC AUTO: 88.1 FL (ref 79–97)
MONOCYTES # BLD AUTO: 0.77 10*3/MM3 (ref 0.1–0.9)
MONOCYTES NFR BLD AUTO: 7.1 % (ref 5–12)
NEUTROPHILS # BLD AUTO: 8.3 10*3/MM3 (ref 1.7–7)
NEUTROPHILS NFR BLD AUTO: 76.9 % (ref 42.7–76)
NRBC BLD AUTO-RTO: 0 /100 WBC (ref 0–0.2)
PLATELET # BLD AUTO: 174 10*3/MM3 (ref 140–450)
PMV BLD AUTO: 11.7 FL (ref 6–12)
RBC # BLD AUTO: 3.85 10*6/MM3 (ref 3.77–5.28)
WBC NRBC COR # BLD: 10.78 10*3/MM3 (ref 3.4–10.8)

## 2019-10-29 PROCEDURE — 85025 COMPLETE CBC W/AUTO DIFF WBC: CPT | Performed by: OBSTETRICS & GYNECOLOGY

## 2019-10-29 RX ORDER — OXYCODONE AND ACETAMINOPHEN 10; 325 MG/1; MG/1
1 TABLET ORAL EVERY 4 HOURS PRN
Status: DISCONTINUED | OUTPATIENT
Start: 2019-10-29 | End: 2019-10-31 | Stop reason: HOSPADM

## 2019-10-29 RX ORDER — SIMETHICONE 80 MG
80 TABLET,CHEWABLE ORAL 4 TIMES DAILY PRN
Status: DISCONTINUED | OUTPATIENT
Start: 2019-10-29 | End: 2019-10-31 | Stop reason: HOSPADM

## 2019-10-29 RX ADMIN — OXYCODONE HYDROCHLORIDE AND ACETAMINOPHEN 1 TABLET: 10; 325 TABLET ORAL at 20:51

## 2019-10-29 RX ADMIN — OXYCODONE HYDROCHLORIDE AND ACETAMINOPHEN 1 TABLET: 10; 325 TABLET ORAL at 15:33

## 2019-10-29 RX ADMIN — OXYCODONE AND ACETAMINOPHEN 2 TABLET: 5; 325 TABLET ORAL at 06:03

## 2019-10-29 RX ADMIN — DOCUSATE SODIUM 100 MG: 100 CAPSULE, LIQUID FILLED ORAL at 20:51

## 2019-10-29 RX ADMIN — DOCUSATE SODIUM 100 MG: 100 CAPSULE, LIQUID FILLED ORAL at 08:09

## 2019-10-29 RX ADMIN — SIMETHICONE CHEW TAB 80 MG 80 MG: 80 TABLET ORAL at 20:51

## 2019-10-29 RX ADMIN — OXYCODONE HYDROCHLORIDE AND ACETAMINOPHEN 1 TABLET: 10; 325 TABLET ORAL at 11:08

## 2019-10-29 RX ADMIN — METHADONE HYDROCHLORIDE 80 MG: 10 TABLET ORAL at 08:09

## 2019-10-29 RX ADMIN — OXYCODONE AND ACETAMINOPHEN 2 TABLET: 5; 325 TABLET ORAL at 02:23

## 2019-10-29 RX ADMIN — OXYCODONE HYDROCHLORIDE AND ACETAMINOPHEN 1 TABLET: 10; 325 TABLET ORAL at 10:28

## 2019-10-30 PROCEDURE — 99024 POSTOP FOLLOW-UP VISIT: CPT | Performed by: OBSTETRICS & GYNECOLOGY

## 2019-10-30 RX ORDER — TRAMADOL HYDROCHLORIDE 50 MG/1
50 TABLET ORAL EVERY 6 HOURS PRN
Status: DISCONTINUED | OUTPATIENT
Start: 2019-10-30 | End: 2019-10-30

## 2019-10-30 RX ADMIN — DOCUSATE SODIUM 100 MG: 100 CAPSULE, LIQUID FILLED ORAL at 09:00

## 2019-10-30 RX ADMIN — OXYCODONE HYDROCHLORIDE AND ACETAMINOPHEN 1 TABLET: 10; 325 TABLET ORAL at 19:55

## 2019-10-30 RX ADMIN — METHADONE HYDROCHLORIDE 80 MG: 10 TABLET ORAL at 10:16

## 2019-10-30 RX ADMIN — OXYCODONE HYDROCHLORIDE AND ACETAMINOPHEN 1 TABLET: 10; 325 TABLET ORAL at 06:20

## 2019-10-30 RX ADMIN — OXYCODONE HYDROCHLORIDE AND ACETAMINOPHEN 1 TABLET: 10; 325 TABLET ORAL at 01:16

## 2019-10-30 RX ADMIN — OXYCODONE HYDROCHLORIDE AND ACETAMINOPHEN 1 TABLET: 10; 325 TABLET ORAL at 15:34

## 2019-10-30 RX ADMIN — OXYCODONE HYDROCHLORIDE AND ACETAMINOPHEN 1 TABLET: 10; 325 TABLET ORAL at 11:26

## 2019-10-31 VITALS
DIASTOLIC BLOOD PRESSURE: 92 MMHG | BODY MASS INDEX: 29.66 KG/M2 | RESPIRATION RATE: 16 BRPM | OXYGEN SATURATION: 98 % | WEIGHT: 178 LBS | SYSTOLIC BLOOD PRESSURE: 147 MMHG | HEIGHT: 65 IN | HEART RATE: 109 BPM | TEMPERATURE: 98.8 F

## 2019-10-31 PROBLEM — Z98.891 S/P CESAREAN SECTION: Status: ACTIVE | Noted: 2019-10-31

## 2019-10-31 RX ORDER — OXYCODONE AND ACETAMINOPHEN 10; 325 MG/1; MG/1
1 TABLET ORAL EVERY 6 HOURS PRN
Qty: 28 TABLET | Refills: 0 | Status: SHIPPED | OUTPATIENT
Start: 2019-10-31 | End: 2022-05-10

## 2019-10-31 RX ADMIN — DOCUSATE SODIUM 100 MG: 100 CAPSULE, LIQUID FILLED ORAL at 09:11

## 2019-10-31 RX ADMIN — DOCUSATE SODIUM 100 MG: 100 CAPSULE, LIQUID FILLED ORAL at 00:12

## 2019-10-31 RX ADMIN — OXYCODONE HYDROCHLORIDE AND ACETAMINOPHEN 1 TABLET: 10; 325 TABLET ORAL at 09:11

## 2019-10-31 RX ADMIN — OXYCODONE HYDROCHLORIDE AND ACETAMINOPHEN 1 TABLET: 10; 325 TABLET ORAL at 13:45

## 2019-10-31 RX ADMIN — OXYCODONE HYDROCHLORIDE AND ACETAMINOPHEN 1 TABLET: 10; 325 TABLET ORAL at 04:21

## 2019-10-31 RX ADMIN — METHADONE HYDROCHLORIDE 80 MG: 10 TABLET ORAL at 09:11

## 2019-10-31 RX ADMIN — OXYCODONE HYDROCHLORIDE AND ACETAMINOPHEN 1 TABLET: 10; 325 TABLET ORAL at 00:12

## 2019-11-01 NOTE — PROGRESS NOTES
Continued Stay Note  [facility]     Patient Name: Hanh Ndiaye  MRN: [id number]  Today's Date: 11/1/2019    Admit Date: 10/28/2019    Discharge Plan     Row Name 11/01/19 0923       Plan    Plan  Infant to discharge home with mother when medically ready. ROBYN Gomez    Plan Comments  Mother: Hanh Ndiaye, MRN [id number]; Infant: Jes Ndiaye, MRN [id number]. CSW reviewed cord toxicology results, which are positive for methadone and methadone metabolite. CSW made a CPS report via web reporting #[id number]. CSW indicated in report that mother is in a methadone treatment program at [facility]. CSW will follow up with CPS to see if report was accepted for investigation. CSW will also continue to follow to assist with needs. ROBYN Gomez        Discharge Codes    No documentation.       Expected Discharge Date and Time     Expected Discharge Date Expected Discharge Time    Oct 31, 2019             ISRAEL Gomez

## 2019-11-03 LAB — METHADONE, URINE: POSITIVE

## 2019-11-04 ENCOUNTER — TELEPHONE (OUTPATIENT)
Dept: OBSTETRICS AND GYNECOLOGY | Facility: CLINIC | Age: 29
End: 2019-11-04

## 2019-11-04 NOTE — PROGRESS NOTES
Continued Stay Note  [facility]     Patient Name: Hanh Ndiaye  MRN: [id number]  Today's Date: 11/4/2019    Admit Date: 10/28/2019    Discharge Plan     Row Name 11/04/19 1305       Plan    Plan  Infant to discharge home with mother when medically ready. ROBYN Gomez    Plan Comments  Mother: Hanh Ndiaye, MRN [id number]; Infant: Jes Ndiaye, MRN [id number]. CSW spoke with Yoli at [facility] hotline who advised report #[id number] was not accepted for investigation. CSW will continue to follow and assist with discharge needs. ROBYN Gomez        Discharge Codes    No documentation.       Expected Discharge Date and Time     Expected Discharge Date Expected Discharge Time    Oct 31, 2019             ISRAEL Gomez

## 2019-11-07 ENCOUNTER — OFFICE VISIT (OUTPATIENT)
Dept: OBSTETRICS AND GYNECOLOGY | Facility: CLINIC | Age: 29
End: 2019-11-07

## 2019-11-07 VITALS
HEIGHT: 65 IN | BODY MASS INDEX: 26.99 KG/M2 | WEIGHT: 162 LBS | SYSTOLIC BLOOD PRESSURE: 130 MMHG | DIASTOLIC BLOOD PRESSURE: 88 MMHG

## 2019-11-07 DIAGNOSIS — R35.0 URINARY FREQUENCY: ICD-10-CM

## 2019-11-07 DIAGNOSIS — Z09 POSTOP CHECK: Primary | ICD-10-CM

## 2019-11-07 LAB
BILIRUB BLD-MCNC: NEGATIVE MG/DL
CLARITY, POC: CLEAR
COLOR UR: YELLOW
GLUCOSE UR STRIP-MCNC: NEGATIVE MG/DL
KETONES UR QL: NEGATIVE
LEUKOCYTE EST, POC: ABNORMAL
NITRITE UR-MCNC: NEGATIVE MG/ML
PH UR: 7 [PH] (ref 5–8)
PROT UR STRIP-MCNC: NEGATIVE MG/DL
RBC # UR STRIP: ABNORMAL /UL
SP GR UR: 1.02 (ref 1–1.03)
UROBILINOGEN UR QL: NORMAL

## 2019-11-07 PROCEDURE — 0503F POSTPARTUM CARE VISIT: CPT | Performed by: OBSTETRICS & GYNECOLOGY

## 2019-11-07 PROCEDURE — 81002 URINALYSIS NONAUTO W/O SCOPE: CPT | Performed by: OBSTETRICS & GYNECOLOGY

## 2019-11-07 NOTE — PROGRESS NOTES
LAZARA Ndiaye  is a 29 y.o. female who presents for a postoperative checkup.  She had a  delivery last week.  Her baby is in the NICU, for withdrawal.  Overall, the baby is doing well.  The patient is bottlefeeding.  She is feeling well.  Her lochia has resolved.  Surgical pain has resolved.  She does have some cramping at the end of urination.    Chief Complaint   Patient presents with   • Postpartum Care     Patient is here for a 1 week postpartum for incision recheck.       Past Medical History:   Diagnosis Date   • Asthma    • Kidney stone    • Migraine    • Substance abuse (CMS/[facility])        Past Surgical History:   Procedure Laterality Date   • APPENDECTOMY     •  SECTION N/A 2017    Procedure:  SECTION PRIMARY;  Surgeon: Isma Lerner MD;  Location: [facility] LABOR DELIVERY;  Service:    •  SECTION N/A 10/28/2019    Procedure:  SECTION REPEAT;  Surgeon: Aleksey Hill MD;  Location: [facility] LABOR DELIVERY;  Service: Obstetrics/Gynecology       Social History     Socioeconomic History   • Marital status:      Spouse name: Not on file   • Number of children: Not on file   • Years of education: Not on file   • Highest education level: Not on file   Tobacco Use   • Smoking status: Former Smoker     Packs/day: 1.00     Types: Cigarettes     Last attempt to quit: 2017     Years since quittin.2   • Smokeless tobacco: Never Used   Substance and Sexual Activity   • Drug use: Yes     Types: Hydrocodone     Comment: Former opioid narcotic abuse - none since    • Sexual activity: Yes     Partners: Male       The following portions of the patient's history were reviewed and updated as appropriate: allergies, current medications, past family history, past medical history, past social history, past surgical history and problem list.    Review of Systems          Physical Exam   Constitutional: She is oriented to person, place, and time. She appears  well-developed and well-nourished.   Abdominal:   The abdomen is soft and nondistended.  It is nontender to palpation.  Pfannenstiel incision is well approximated.  Erythema and induration are absent.   Neurological: She is alert and oriented to person, place, and time.   Skin: Skin is warm and dry.   Psychiatric: She has a normal mood and affect. Her behavior is normal.   Nursing note and vitals reviewed.      Assessment    Hanh was seen today for postpartum care.    Diagnoses and all orders for this visit:    Postop check        Plan  1. Appropriate post operative progress.  Follow-up in 5 weeks for a postpartum checkup  2. Contraceptive counseling.  Father the baby plans to have a vasectomy.  3. Pelvic cramping at the end of urination.  Counseled.  I recommend a urine culture.  The patient agrees.    4. Return in about 5 weeks (around 2019).    Social History     Tobacco Use   Smoking Status Former Smoker   • Packs/day: 1.00   • Types: Cigarettes   • Last attempt to quit: 2017   • Years since quittin.2   5.     6.

## 2019-11-08 LAB
BACTERIA UR CULT: NORMAL
BACTERIA UR CULT: NORMAL

## 2019-12-17 ENCOUNTER — TELEPHONE (OUTPATIENT)
Dept: OBSTETRICS AND GYNECOLOGY | Facility: CLINIC | Age: 29
End: 2019-12-17

## 2021-04-16 ENCOUNTER — BULK ORDERING (OUTPATIENT)
Dept: CASE MANAGEMENT | Facility: OTHER | Age: 31
End: 2021-04-16

## 2021-04-16 DIAGNOSIS — Z23 IMMUNIZATION DUE: ICD-10-CM

## 2022-05-10 ENCOUNTER — OFFICE VISIT (OUTPATIENT)
Dept: FAMILY MEDICINE CLINIC | Facility: CLINIC | Age: 32
End: 2022-05-10

## 2022-05-10 VITALS
HEIGHT: 65 IN | DIASTOLIC BLOOD PRESSURE: 66 MMHG | OXYGEN SATURATION: 99 % | SYSTOLIC BLOOD PRESSURE: 108 MMHG | HEART RATE: 92 BPM | WEIGHT: 153 LBS | BODY MASS INDEX: 25.49 KG/M2

## 2022-05-10 DIAGNOSIS — Z00.00 ANNUAL PHYSICAL EXAM: Primary | ICD-10-CM

## 2022-05-10 DIAGNOSIS — Z13.1 DIABETES MELLITUS SCREENING: ICD-10-CM

## 2022-05-10 DIAGNOSIS — Z13.220 LIPID SCREENING: ICD-10-CM

## 2022-05-10 DIAGNOSIS — R10.13 EPIGASTRIC PAIN: ICD-10-CM

## 2022-05-10 PROCEDURE — 99385 PREV VISIT NEW AGE 18-39: CPT

## 2022-05-10 PROCEDURE — 99203 OFFICE O/P NEW LOW 30 MIN: CPT

## 2022-05-10 RX ORDER — FAMOTIDINE 20 MG/1
TABLET, FILM COATED ORAL
Qty: 180 TABLET | Refills: 0 | Status: SHIPPED | OUTPATIENT
Start: 2022-05-10 | End: 2022-08-12

## 2022-05-10 RX ORDER — FAMOTIDINE 20 MG/1
20 TABLET, FILM COATED ORAL 2 TIMES DAILY
Qty: 60 TABLET | Refills: 0 | Status: SHIPPED | OUTPATIENT
Start: 2022-05-10 | End: 2022-05-10

## 2022-05-10 NOTE — PROGRESS NOTES
Subjective   Hanh Ndiaye is a 32 y.o. female. Presents today as a New patient here to establish care, annual and abd pain  Chief Complaint   Patient presents with   • Annual Exam     Physical new patient   • Abdominal Pain       History of Present Illness  Previous PCP: Dr Perkins  Significant medical hx: Drug abuse, acne  Significant family hx: COPD, Asthma, HTN, bipolar D/O, depression (mother); DM2 (aunt)    Denies Smoking- Hx 1 pack/day x 10 years. 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  When she eats has pain in epigastric area. Pain is intermittent. Describes as cramping, severe pain. Used to happen every time she ate, now Better when eating. Can only tolerate chicken noodle soup.   Some nausea, no vomiting. No diarrhea, constipation. No acid reflux. Does have hx.   Had chills first night. Pain radiates to shoulder blades first couple of days. Now improving. Has to lie on back.     Food was making this worse now seems helps? Lying on back makes it worse.   Has not tried anything for symptoms    Last Pap- 2019- negative  No other concerns today.     Review of Systems   Constitutional: Positive for fatigue. Negative for chills and fever.   Eyes: Negative for visual disturbance.   Respiratory: Negative.    Cardiovascular: Negative.    Gastrointestinal: Positive for abdominal pain and nausea. Negative for abdominal distention, anal bleeding, blood in stool, constipation, diarrhea and vomiting.   Endocrine: Negative.    Genitourinary: Negative for decreased urine volume, difficulty urinating, dysuria and urgency.       Patient Active Problem List   Diagnosis   • Encounter for supervision of normal first pregnancy in third trimester   • Late prenatal care affecting pregnancy in  second trimester   • Methadone maintenance treatment affecting pregnancy in third trimester (HCC)   • Tobacco smoking affecting pregnancy in third trimester   • Breech presentation   • Pregnancy   • S/P primary low transverse    • H/O  section   • S/P  section     Past Medical History:   Diagnosis Date   • Asthma    • Kidney stone    • Migraine    • Substance abuse (HCC)      Past Surgical History:   Procedure Laterality Date   • APPENDECTOMY     •  SECTION N/A 2017    Procedure:  SECTION PRIMARY;  Surgeon: Isma Lerner MD;  Location: [facility] LABOR DELIVERY;  Service:    •  SECTION N/A 10/28/2019    Procedure:  SECTION REPEAT;  Surgeon: Alkesey Hill MD;  Location: [facility] LABOR DELIVERY;  Service: Obstetrics/Gynecology     Family History   Problem Relation Age of Onset   • COPD Mother         heavy smoker   • Colon cancer Maternal Grandmother         brain mets   • No Known Problems Father         Did not know or his side of family   • Stroke Maternal Grandfather    • Diabetes Maternal Aunt      Social History     Socioeconomic History   • Marital status:    Tobacco Use   • Smoking status: Former Smoker     Packs/day: 1.00     Years: 12.00     Pack years: 12.00     Types: Cigarettes     Start date:      Quit date: 2017     Years since quittin.7   • Smokeless tobacco: Never Used   Substance and Sexual Activity   • Alcohol use: Never   • Drug use: Yes     Types: Hydrocodone     Comment: Former opioid narcotic abuse - none since    • Sexual activity: Yes     Partners: Male       Allergies   Allergen Reactions   • Ibuprofen Anaphylaxis     Throat swells       Current Outpatient Medications on File Prior to Visit   Medication Sig Dispense Refill   • diphenhydrAMINE (BENADRYL) 25 mg capsule Take 25 mg by mouth Every 6 (Six) Hours As Needed for Allergies.     • methadone (DOLOPHINE) 10 MG/ML solution Take 70 mg by mouth Daily,    "  • PRENATAL GUMMY VITAMIN Chew 1 each Daily.     • [DISCONTINUED] oxyCODONE-acetaminophen (PERCOCET)  MG per tablet Take 1 tablet by mouth Every 6 (Six) Hours As Needed for Moderate Pain . 28 tablet 0     No current facility-administered medications on file prior to visit.       Objective   Vitals:    05/10/22 0805   BP: 108/66   Pulse: 92   SpO2: 99%   Weight: 69.4 kg (153 lb)   Height: 165.1 cm (65\")   PainSc: 0-No pain     Body mass index is 25.46 kg/m².  Physical Exam  Constitutional:       Appearance: Normal appearance. She is not ill-appearing.   Eyes:      Conjunctiva/sclera: Conjunctivae normal.   Neck:      Thyroid: No thyroid mass, thyromegaly or thyroid tenderness.   Cardiovascular:      Rate and Rhythm: Normal rate and regular rhythm.      Pulses: Normal pulses.           Carotid pulses are 2+ on the right side and 2+ on the left side.     Heart sounds: Normal heart sounds, S1 normal and S2 normal. No murmur heard.  Pulmonary:      Effort: Pulmonary effort is normal. No respiratory distress.      Breath sounds: Normal breath sounds.   Abdominal:      General: Bowel sounds are normal. There is no distension.      Palpations: Abdomen is soft. There is no hepatomegaly, splenomegaly, mass or pulsatile mass.      Tenderness: There is abdominal tenderness in the epigastric area. There is no guarding or rebound. Negative signs include Marlow's sign and McBurney's sign.      Hernia: No hernia is present.       Musculoskeletal:      Right lower leg: No edema.      Left lower leg: No edema.   Lymphadenopathy:      Cervical: No cervical adenopathy.   Neurological:      General: No focal deficit present.      Mental Status: She is alert and oriented to person, place, and time.      Gait: Gait is intact.   Psychiatric:         Attention and Perception: Attention normal.         Mood and Affect: Mood normal.         Speech: Speech normal.         Behavior: Behavior normal.         Thought Content: Thought " content normal.         Cognition and Memory: Cognition normal.         Judgment: Judgment normal.       [unfilled]  Diagnoses and all orders for this visit:    1. Annual physical exam (Primary)  -     CBC & Differential  -     Comprehensive Metabolic Panel  -     Lipid Panel  -     TSH  -     Pap UTD  -     Eat a healthy diet and exercise routinely. Avoid smoking/alcohol and drugs. Use seatbelt 100% of time.     2. Epigastric pain  -     Comprehensive Metabolic Panel  -     Cancel: H. Pylori Breath Test - , Lung  -     US Abdomen Complete; Future  -     Lipase  -     Amylase  -     famotidine (Pepcid) 20 MG tablet; Take 1 tablet by mouth 2 (Two) Times a Day.  Dispense: 60 tablet; Refill: 0  -     H. Pylori Breath Test - , Lung  -     Discussed GI Ulcer vs gastritis vs GERD vs gallbladder. Will check labs and get US. Start [id number] to see if helps- use discussed. Continue with bland diet and advance as tolerated. Stay hydrated. Will call once results available with further advise    3. Lipid screening  -     Lipid Panel    4. Diabetes mellitus screening  -     Hemoglobin A1c       -Follow up: annually. Will call once results available with further advise. Sooner as needed. Call if questions/concers.      - Pt agrees with plan of care and states no further concerns or questions today    This document is intended for medical expert use only. Reading of this document by patients and/or patient's family without participating medical staff guidance may result in misinterpretation and unintended morbidity.  Any interpretation of such data is the responsibility of the patient and/or family member responsible for the patient in concert with their primary or specialist providers, not to be left for sources of online searches such as [employer], [employer] or similar queries. Relying on these approaches to knowledge may result in misinterpretation, misguided goals of care and even death should patients or family members  try recommendations outside of the realm of professional medical care in a supervised way.     Please allow 3-5 business days for recommendations based on new results     Go to the ER for any possible lifethreatening symptoms such as severe pain, chest pain or shortness of air.      I personally spent 30 minutes with this patient, preparing for the visit, reviewing tests, obtaining and/or reviewing a separately obtained history, performing a medically appropriate examination and/or evaluation , counseling and educating the patient/family/caregiver, ordering medications, tests, or procedures, documenting information in the medical record and independently interpreting results.

## 2022-05-11 LAB
ALBUMIN SERPL-MCNC: 4.2 G/DL (ref 3.8–4.8)
ALBUMIN/GLOB SERPL: 1.8 {RATIO} (ref 1.2–2.2)
ALP SERPL-CCNC: 56 IU/L (ref 44–121)
ALT SERPL-CCNC: 24 IU/L (ref 0–32)
AMYLASE SERPL-CCNC: 46 U/L (ref 31–110)
AST SERPL-CCNC: 20 IU/L (ref 0–40)
BASOPHILS # BLD AUTO: 0 X10E3/UL (ref 0–0.2)
BASOPHILS NFR BLD AUTO: 0 %
BILIRUB SERPL-MCNC: 0.2 MG/DL (ref 0–1.2)
BUN SERPL-MCNC: 11 MG/DL (ref 6–20)
BUN/CREAT SERPL: 14 (ref 9–23)
CALCIUM SERPL-MCNC: 9.2 MG/DL (ref 8.7–10.2)
CHLORIDE SERPL-SCNC: 104 MMOL/L (ref 96–106)
CHOLEST SERPL-MCNC: 169 MG/DL (ref 100–199)
CO2 SERPL-SCNC: 24 MMOL/L (ref 20–29)
CREAT SERPL-MCNC: 0.81 MG/DL (ref 0.57–1)
EGFRCR SERPLBLD CKD-EPI 2021: 99 ML/MIN/1.73
EOSINOPHIL # BLD AUTO: 0.3 X10E3/UL (ref 0–0.4)
EOSINOPHIL NFR BLD AUTO: 5 %
ERYTHROCYTE [DISTWIDTH] IN BLOOD BY AUTOMATED COUNT: 11.9 % (ref 11.7–15.4)
GLOBULIN SER CALC-MCNC: 2.3 G/DL (ref 1.5–4.5)
GLUCOSE SERPL-MCNC: 86 MG/DL (ref 65–99)
HBA1C MFR BLD: 5.1 % (ref 4.8–5.6)
HCT VFR BLD AUTO: 35.7 % (ref 34–46.6)
HDLC SERPL-MCNC: 33 MG/DL
HGB BLD-MCNC: 11.2 G/DL (ref 11.1–15.9)
IMM GRANULOCYTES # BLD AUTO: 0 X10E3/UL (ref 0–0.1)
IMM GRANULOCYTES NFR BLD AUTO: 0 %
LDLC SERPL CALC-MCNC: 117 MG/DL (ref 0–99)
LIPASE SERPL-CCNC: 25 U/L (ref 14–72)
LYMPHOCYTES # BLD AUTO: 2.3 X10E3/UL (ref 0.7–3.1)
LYMPHOCYTES NFR BLD AUTO: 40 %
MCH RBC QN AUTO: 28.5 PG (ref 26.6–33)
MCHC RBC AUTO-ENTMCNC: 31.4 G/DL (ref 31.5–35.7)
MCV RBC AUTO: 91 FL (ref 79–97)
MONOCYTES # BLD AUTO: 0.6 X10E3/UL (ref 0.1–0.9)
MONOCYTES NFR BLD AUTO: 10 %
NEUTROPHILS # BLD AUTO: 2.6 X10E3/UL (ref 1.4–7)
NEUTROPHILS NFR BLD AUTO: 45 %
PLATELET # BLD AUTO: 240 X10E3/UL (ref 150–450)
POTASSIUM SERPL-SCNC: 3.9 MMOL/L (ref 3.5–5.2)
PROT SERPL-MCNC: 6.5 G/DL (ref 6–8.5)
RBC # BLD AUTO: 3.93 X10E6/UL (ref 3.77–5.28)
SODIUM SERPL-SCNC: 143 MMOL/L (ref 134–144)
TRIGL SERPL-MCNC: 101 MG/DL (ref 0–149)
TSH SERPL DL<=0.005 MIU/L-ACNC: 1.82 UIU/ML (ref 0.45–4.5)
UREA BREATH TEST QL: NEGATIVE
VLDLC SERPL CALC-MCNC: 19 MG/DL (ref 5–40)
WBC # BLD AUTO: 5.8 X10E3/UL (ref 3.4–10.8)

## 2022-05-11 NOTE — PROGRESS NOTES
Please inform pt of lab results.     Kidney, liver and thyroid function normal  Blood levels normal  A1C within normal limits- no diabetes  Cholesterol mildly elevated. Work on healthy diet. Limit bad fats such as fried foods, red meats and increase whole grains, vegetables, olive oil, fish, nuts, avocado, etc. Add routine exercise. Drink at least 64 oz water daily.     Tests for Hpylori (ulcer) and for pancreas (amylase and lipase) normal.     At this point think symptoms related to dyspepsia- would still do ultrasound and start new medication as prescribed. May also try IB darlene over the counter to see if helps.     Will call once get results of US. Let me know if no improvement.

## 2022-05-13 DIAGNOSIS — R10.13 EPIGASTRIC PAIN: ICD-10-CM

## 2022-08-12 ENCOUNTER — OFFICE VISIT (OUTPATIENT)
Dept: FAMILY MEDICINE CLINIC | Facility: CLINIC | Age: 32
End: 2022-08-12

## 2022-08-12 VITALS
SYSTOLIC BLOOD PRESSURE: 118 MMHG | HEART RATE: 95 BPM | BODY MASS INDEX: 24.83 KG/M2 | HEIGHT: 65 IN | DIASTOLIC BLOOD PRESSURE: 76 MMHG | OXYGEN SATURATION: 99 % | WEIGHT: 149 LBS

## 2022-08-12 DIAGNOSIS — R20.0 NUMBNESS IN RIGHT LEG: ICD-10-CM

## 2022-08-12 DIAGNOSIS — M54.41 ACUTE RIGHT-SIDED LOW BACK PAIN WITH BILATERAL SCIATICA: Primary | ICD-10-CM

## 2022-08-12 DIAGNOSIS — M54.42 ACUTE RIGHT-SIDED LOW BACK PAIN WITH BILATERAL SCIATICA: Primary | ICD-10-CM

## 2022-08-12 PROCEDURE — 99213 OFFICE O/P EST LOW 20 MIN: CPT

## 2022-08-12 RX ORDER — METHYLPREDNISOLONE 4 MG/1
TABLET ORAL
Qty: 21 EACH | Refills: 0 | Status: SHIPPED | OUTPATIENT
Start: 2022-08-12

## 2022-08-12 NOTE — PROGRESS NOTES
"Umair Ndiaye is a 32 y.o. female. Who presents with complaints of intermittent numbness in lower leg    History of Present Illness     Right lower leg numbness    intermittent issue  x3-4 days  Usually only at night- improves if active  Lateral side of lower leg gets a numb sensation and Feels cold to touch but \"skin feels like on fire\".   Chronic hx low back pain with sciatica  No hx back injuries. No weakness, no loss of bowel/bladder  No new injuries. No redness of leg, no lesions, swelling.   Has not tried anything for this.   The following portions of the patient's history were reviewed and updated as appropriate: allergies, current medications, past family history, past medical history, past social history and past surgical history.    Review of Systems   Constitutional: Negative for chills and fever.   Cardiovascular: Negative.    Musculoskeletal: Positive for back pain. Negative for arthralgias, gait problem, joint swelling and myalgias.   Neurological: Positive for numbness. Negative for weakness.   Psychiatric/Behavioral: Positive for sleep disturbance.       Objective    /76   Pulse 95   Ht 165.1 cm (65\")   Wt 67.6 kg (149 lb)   LMP 07/29/2022   SpO2 99%   BMI 24.79 kg/m²     Physical Exam  Constitutional:       Appearance: Normal appearance. She is not ill-appearing.   Pulmonary:      Effort: Pulmonary effort is normal. No respiratory distress.   Musculoskeletal:      Thoracic back: Normal.      Lumbar back: Tenderness present. No swelling, spasms or bony tenderness. Normal range of motion. Positive right straight leg raise test. Negative left straight leg raise test.        Back:       Right hip: No tenderness or bony tenderness. Normal range of motion.      Right upper leg: No tenderness or bony tenderness.      Right knee: No swelling or bony tenderness. Normal range of motion. No tenderness.      Right lower leg: Normal. No tenderness or bony tenderness.      Right ankle: No " swelling. No tenderness.        Legs:    Neurological:      General: No focal deficit present.      Mental Status: She is alert and oriented to person, place, and time.   Psychiatric:         Mood and Affect: Mood normal.         Behavior: Behavior normal.         Thought Content: Thought content normal.         Judgment: Judgment normal.       Assessment & Plan   Diagnoses and all orders for this visit:    1. Acute right-sided low back pain with bilateral sciatica (Primary)  -     methylPREDNISolone (MEDROL) 4 MG dose pack; Take as directed on package instructions.  Dispense: 21 each; Refill: 0    2. Numbness in right leg    exam consistent with SI syndrome. Discussed complaint most likely related to radiculopathy r/t low back issue.  Allergy to Ibuprofen in past. Will give medrol pack, use and common SE discussed.   Also advised HEP and handouts provided, may also use other online resources.  Heat may help.     Seek immediate care for severe pain, weakness, loss of bowel/bbladder. Pt was also concerned for DVT, no signs/symptoms present, counseled on signs/symptoms to look for and report if occur such as swelling, redness, warmth, SOA, chest pain or severe pain.     If no improvement in 2-4 weeks consider Xray. Come or call sooner if worsening or new issues      - Pt agrees with plan of care and states no further concerns or questions today    This document is intended for medical expert use only. Reading of this document by patients and/or patient's family without participating medical staff guidance may result in misinterpretation and unintended morbidity.  Any interpretation of such data is the responsibility of the patient and/or family member responsible for the patient in concert with their primary or specialist providers, not to be left for sources of online searches such as [employer], [employer] or similar queries. Relying on these approaches to knowledge may result in misinterpretation, misguided goals of care  and even death should patients or family members try recommendations outside of the realm of professional medical care in a supervised way.     Please allow 3-5 business days for recommendations based on new results     Go to the ER for any possible lifethreatening symptoms such as chest pain or shortness of air.      I personally spent 20 minutes with this patient, preparing for the visit, reviewing tests, obtaining and/or reviewing a separately obtained history, performing a medically appropriate examination and/or evaluation , counseling and educating the patient/family/caregiver, ordering medications, tests, or procedures, documenting information in the medical record and independently interpreting results.

## 2023-05-25 NOTE — PROGRESS NOTES
Ultrasound Completed...tw  
Admission Reconciliation is Completed  Discharge Reconciliation is Completed

## 2023-09-19 ENCOUNTER — OFFICE VISIT (OUTPATIENT)
Dept: FAMILY MEDICINE CLINIC | Facility: CLINIC | Age: 33
End: 2023-09-19
Payer: COMMERCIAL

## 2023-09-19 VITALS
DIASTOLIC BLOOD PRESSURE: 72 MMHG | OXYGEN SATURATION: 99 % | HEART RATE: 85 BPM | SYSTOLIC BLOOD PRESSURE: 126 MMHG | WEIGHT: 134.5 LBS | HEIGHT: 65 IN | BODY MASS INDEX: 22.41 KG/M2

## 2023-09-19 DIAGNOSIS — F90.9 ATTENTION DEFICIT HYPERACTIVITY DISORDER (ADHD), UNSPECIFIED ADHD TYPE: ICD-10-CM

## 2023-09-19 DIAGNOSIS — Z00.00 ANNUAL PHYSICAL EXAM: Primary | ICD-10-CM

## 2023-09-19 DIAGNOSIS — N94.10 DYSPAREUNIA IN FEMALE: ICD-10-CM

## 2023-09-19 DIAGNOSIS — E55.9 VITAMIN D DEFICIENCY: ICD-10-CM

## 2023-09-19 DIAGNOSIS — N92.6 IRREGULAR MENSES: ICD-10-CM

## 2023-09-19 DIAGNOSIS — L65.9 HAIR LOSS: ICD-10-CM

## 2023-09-19 RX ORDER — SPIRONOLACTONE 100 MG/1
100 TABLET, FILM COATED ORAL DAILY
COMMUNITY
Start: 2023-08-23

## 2023-09-19 RX ORDER — KETOCONAZOLE 20 MG/ML
1 SHAMPOO TOPICAL 2 TIMES WEEKLY
COMMUNITY
Start: 2023-09-05

## 2023-09-19 NOTE — PROGRESS NOTES
Subjective   Hanh Ndiaye is a 33 y.o. female. Presents today for her annual physical. She would also like to discuss her labs that were done at the dermatologist. She stated she has hair loss and Vitamin D deficiency.  Chief Complaint   Patient presents with    Annual Exam    Vitamin D Deficiency    Alopecia         HPI     Past Medical History:   Diagnosis Date    ADHD (attention deficit hyperactivity disorder) 2004    Asthma     Kidney stone     Migraine     Substance abuse        Past Surgical History:   Procedure Laterality Date    APPENDECTOMY       SECTION N/A 2017    Procedure:  SECTION PRIMARY;  Surgeon: Isma Lerner MD;  Location: [facility] LABOR DELIVERY;  Service:      SECTION N/A 10/28/2019    Procedure:  SECTION REPEAT;  Surgeon: Aleksey Hill MD;  Location: [facility] LABOR DELIVERY;  Service: Obstetrics/Gynecology        Allergies   Allergen Reactions    Ibuprofen Anaphylaxis     Throat swells       Current Outpatient Medications on File Prior to Visit   Medication Sig Dispense Refill    ketoconazole (NIZORAL) 2 % shampoo Apply 1 application  topically to the appropriate area as directed 2 (Two) Times a Week.      methadone (DOLOPHINE) 10 MG/ML solution Take 7 mL by mouth Daily.      spironolactone (ALDACTONE) 100 MG tablet Take 1 tablet by mouth Daily.      [DISCONTINUED] diphenhydrAMINE (BENADRYL) 25 mg capsule Take 25 mg by mouth Every 6 (Six) Hours As Needed for Allergies.      [DISCONTINUED] methylPREDNISolone (MEDROL) 4 MG dose pack Take as directed on package instructions. 21 each 0    [DISCONTINUED] PRENATAL GUMMY VITAMIN Chew 1 each Daily.       No current facility-administered medications on file prior to visit.       Social History     Socioeconomic History    Marital status:    Tobacco Use    Smoking status: Former     Packs/day: 1.00     Years: 12.00     Pack years: 12.00     Types: Cigarettes     Start date: 2005     Quit date:  "2017     Years since quittin.0    Smokeless tobacco: Never   Substance and Sexual Activity    Alcohol use: Not Currently    Drug use: Not Currently     Frequency: 3.0 times per week     Types: Hydrocodone     Comment: Former opioid narcotic abuse - none since     Sexual activity: Not Currently     Partners: Female, Male       Occupation/Lives with: She is a stay-at-home mom. Lives with her , 2 kids.     Sleep: Gets approx 5-7 hours of sleep/night    Exercise: No formal exercise.     Nutrtion: She only eats once/day. She states she forgets to eat. She eats healthy cereals, for dinner it ranges from pizza, spaghetti. Sometimes eats fruits and vegetables.     Caffeine: Drinks a cup of coffee (12 oz)/day, rarely drinks water \"unless she's thirsty.\" No pop or energy drinks.     Tobb/Vaping/Illicit Drugs/ETOH: Former smoker, vapes occasionally (no-nicotine), no flavoring. Denies marijuana use. Denies any alcohol use.     Sexual hx (#partners, m/f, bc, LMP): Not currently sexually active d/t pain with sex. LMP 2 weeks ago. No birth control.     Mood: \"I'm okay.\"     Safety: Feels safe at home with the people he/she is around.    Health Maintenance/Labs: UTD, labs recently done for her 's insurance  _____________________________________  Vitamin D deficiency: Recent labs done by dermatologist showed mild Vit D deficiency. She is not currently taking a supplement. Denies muscle weakness, abnormal fatigue, bone pain, paresthesias.     Hair loss: Pt states that her hair has been falling out for over 1 year. She is 3 years postpartum. No hx of issue before. She recently saw dermatology, who started her on spironolactone and ketoconazole shampoo. She has not started ketoconazole shampoo yet. Derm also performed labs and checked thyroid, vitamin levels, iron, results negative. Her mother also has thinning hair, but did not begin until she was older in age. Pt states she is under stress with young kids. " "Denies palpitations, anxiety, dizziness.     Painful sex/irregular menses: Pt states that she rarely has sex because it is painful for her. This started after her last child was born, 3 years ago. She states she also has significant mucous-like discharge \"the size of a grape\" and this month she had 2 separate episodes of bleeding. LMP 2 weeks ago. She is not currently using any form of birth control. She has never seen another provider for this issue.     ADHD: History of ADHD, diagnosed at the age of 4. She has not taken any medication since high school. She is open to referral to psych for management.     Review of Systems    Denies dizziness, fatigue, fever/chills, CP, SOA, headache, blood in urine/stool, abd pain, leg swelling.      Objective   Vitals:    09/19/23 0800   BP: 126/72   Pulse: 85   SpO2: 99%   Weight: 61 kg (134 lb 8 oz)   Height: 165.1 cm (65\")     Body mass index is 22.38 kg/m².    Physical Exam    Procedures     Assessment & Plan   Diagnoses and all orders for this visit:    1. Annual physical exam (Primary)    2. Attention deficit hyperactivity disorder (ADHD), unspecified ADHD type  -     Ambulatory Referral to Psychiatry    3. Hair loss    4. Dyspareunia in female    5. Vitamin D deficiency    6. Irregular menses       Plan:     Start taking OTC Vitamin D3 supplement.   Schedule psych appt to discuss ADHD  Schedule OB/GYN appt to f/u hair loss, dyspareunia  Follow up in 1 year or sooner as needed    BMI is within normal parameters. No other follow-up for BMI required.     Return in about 1 year (around 9/19/2024) for Annual physical.     Counseled on a healthy diet. Use condoms 100% of time with sex as IUD does not protect against STIs. Eat a healthy diet and exercise routinely. Avoid smoking/alcohol and drugs. Use seatbelt 100% of time.     Discussed Care Gaps, ordered referrals and encouraged vaccination updates.       - Pt agrees with plan of care and denies further questions/concerns " today  - This document is intended for medical expert use only. Persons  reading this document without medical staff guidance may result in misinterpretation and unintended morbidity     Go to the ER for any possible life-threatening symptoms such as chest pain or shortness of air.      Please allow 3-5 business days for recommendations based on new results      I personally spent time with this patient, preparing for the visit, reviewing tests, obtaining and/or reviewing a separately obtained history, performing a medically appropriate examination and/or evaluation, counseling and educating the patient/family/caregiver, ordering medications,  documenting information in the medical record and indepentently interpreting results.

## 2024-01-09 ENCOUNTER — TELEPHONE (OUTPATIENT)
Dept: BEHAVIORAL HEALTH | Facility: CLINIC | Age: 34
End: 2024-01-09
Payer: COMMERCIAL

## 2024-01-09 NOTE — TELEPHONE ENCOUNTER
LVM for patient to return call to speak with MA for [facility] regarding initial evaluation for ADHD. [facility]f needs diagnosis of ADHD from prior psych or neuropsych testing for ADHD for [facility] to treat this. [facility] does not diagnose ADHD.

## 2024-01-18 NOTE — TELEPHONE ENCOUNTER
Called patient. Requested more information into ADHD diagnosis, per [facility]'s request, for patient's upcoming psych initial eval with JESUS Mccrary. Patient states she was diagnosed with ADHD in childhood. Patient informed that [facility] requires proof of prior diagnosis or recent neuropsychological testing in order to treat ADHD. [facility] does not diagnose patients with attention deficit disorders and will not treat them until criteria is met. Patient verbalized an understanding of this. Patient states they will attempt to provide proof of childhood diagnosis.

## 2024-01-23 ENCOUNTER — PRIOR AUTHORIZATION (OUTPATIENT)
Dept: BEHAVIORAL HEALTH | Facility: CLINIC | Age: 34
End: 2024-01-23

## 2024-01-23 ENCOUNTER — OFFICE VISIT (OUTPATIENT)
Dept: BEHAVIORAL HEALTH | Facility: CLINIC | Age: 34
End: 2024-01-23
Payer: COMMERCIAL

## 2024-01-23 VITALS
HEIGHT: 65 IN | SYSTOLIC BLOOD PRESSURE: 132 MMHG | BODY MASS INDEX: 23.22 KG/M2 | RESPIRATION RATE: 16 BRPM | HEART RATE: 99 BPM | OXYGEN SATURATION: 98 % | DIASTOLIC BLOOD PRESSURE: 82 MMHG | WEIGHT: 139.4 LBS

## 2024-01-23 DIAGNOSIS — Z00.8 EVALUATION BY PSYCHIATRIC SERVICE REQUIRED: Primary | ICD-10-CM

## 2024-01-23 DIAGNOSIS — R41.840 POOR CONCENTRATION: ICD-10-CM

## 2024-01-23 PROCEDURE — 90792 PSYCH DIAG EVAL W/MED SRVCS: CPT

## 2024-01-23 RX ORDER — CLOBETASOL PROPIONATE 0.46 MG/ML
SOLUTION TOPICAL
COMMUNITY
Start: 2023-12-05

## 2024-01-23 RX ORDER — ATOMOXETINE 40 MG/1
CAPSULE ORAL
Qty: 53 CAPSULE | Refills: 0 | Status: SHIPPED | OUTPATIENT
Start: 2024-01-23 | End: 2024-02-22

## 2024-01-23 RX ORDER — MINOXIDIL 2.5 MG/1
0.5 TABLET ORAL DAILY
COMMUNITY
Start: 2023-12-05

## 2024-01-23 NOTE — PROGRESS NOTES
"  Subjective    PATIENT ID: Hanh Ndiaye, :1990, MRN: [id number]    Chief Complaint   Patient presents with    ADHD     HPI:   33 y.o. female pt presents to [facility] 2024 at the request of Karla Gant.  Patient was instructed to complete new patient paperwork and return to my office prior to appointment, patient did not do this as instructed, patient arrived with documents, briefly reviewed. Patient was seen by PCP back in September, at that time patient reported history of ADHD in childhood and referral was placed to myself at that time.  Patient was instructed by my medical assistant prior to appointment and during assessment today that I require neuropsych testing or proof of previous diagnosis, patient did produce 2 old medication bottles for Adderall and Ritalin from > 10 years ago, reports she believes that should be enough proof.  Patient currently does not work, is not in school, reports poor focus/concentration at home caring for 2 children, patient plans on going back to school reports wanting to be on medication to help aid that.  In addition, patient reports history of withdrawal from opiates and that is why she has been on methadone for multiple years.  Patient was educated on substantial risk with stimulant meds including tolerance/addiction/diversion, patient response is \"I was never addicted to them, they never gave me energy, they only helped,\" unclear how patient has been able to manage S/S of ADHD until recently.    Patient Active Problem List   Diagnosis    Encounter for supervision of normal first pregnancy in third trimester    Late prenatal care affecting pregnancy in second trimester    Methadone maintenance treatment affecting pregnancy in third trimester    Tobacco smoking affecting pregnancy in third trimester    Breech presentation    Pregnancy    S/P primary low transverse     H/O  section    S/P  section    " Poor concentration     PSYCHIATRIC HISTORY:    -Previous psychiatric treatment and medication trials:   Adderall  Ritalin  Methadone 70 mg currently  Imipramine    -Previous diagnoses: Substance Abuse, ADHD  -Previous therapy: One-on-one counseling with therapist at methadone clinic  -Previous psychiatric hospitalizations: Denies  -Previous suicide attempts: Denies  -Previous self harming: Denies  -History of trauma/abuse: Not discussed in detail, patient reports she ran away from home in adolescence  -History of drug or alcohol abuse: Opiates (hydrocodone), reports sobriety since , reports being treated with pain medication and due to withdrawal symptoms her mother sent her to a methadone clinic and she has been doing this for some time, reports she was originally on 90 mg but is currently on 70  -Family psychiatry history: Listed below    SUBSTANCE/SEXUAL HISTORY:  Social History     Socioeconomic History    Marital status:    Tobacco Use    Smoking status: Former     Packs/day: 1.00     Years: 12.00     Additional pack years: 0.00     Total pack years: 12.00     Types: Cigarettes     Start date: 2005     Quit date: 2017     Years since quittin.4     Passive exposure: Past    Smokeless tobacco: Never   Vaping Use    Vaping Use: Never used   Substance and Sexual Activity    Alcohol use: Not Currently    Drug use: Not Currently     Frequency: 3.0 times per week     Types: Hydrocodone     Comment: Former opioid narcotic abuse - none since     Sexual activity: Not Currently     Partners: Female, Male      FAMILY HISTORY:  family history includes ADD / ADHD in her father and maternal aunt; Alcohol abuse in her father and mother; Anxiety disorder in her mother; Asthma in her mother; Bipolar disorder in her mother; COPD in her mother; Colon cancer in her maternal grandmother; Depression in her mother; Diabetes in her maternal aunt; Drug abuse in her father, mother, and sister; Hyperlipidemia  "in her mother; Hypertension in her mother; Miscarriages / Stillbirths in her mother; OCD in her maternal aunt and mother; Stroke in her maternal grandfather.     PAST MEDICAL HISTORY   has a past medical history of ADHD (attention deficit hyperactivity disorder) (01/01/2004), Asthma, Kidney stone, Migraine, Obsessive-compulsive disorder (Unknown), and Substance abuse.    She has no past medical history of Alcohol abuse, Alcoholism, Anorexia nervosa, Anxiety, Autism spectrum disorder, Bipolar disorder, Borderline personality disorder, Bulimia nervosa, Cancer, Chronic pain disorder, Depression, Disease of thyroid gland, Head injury, HIV disease, Liver disease, Oppositional defiant disorder, Panic disorder, Peripheral neuropathy, Psychiatric illness, Psychosis, PTSD (post-traumatic stress disorder), Schizoaffective disorder, Seizures, Self-injurious behavior, Suicide attempt, Violence, history of, Withdrawal symptoms, alcohol, or Withdrawal symptoms, drug or narcotic.     Review of Systems   Constitutional: Negative.  Negative for chills and fever.   Respiratory:  Negative for chest tightness and shortness of breath.    Cardiovascular:  Negative for chest pain.   Gastrointestinal:  Negative for nausea and vomiting.   Neurological:  Positive for memory problem. Negative for dizziness and light-headedness.   Psychiatric/Behavioral:  Positive for decreased concentration and sleep disturbance. Negative for suicidal ideas. The patient is nervous/anxious.            Objective   Visit Vitals  /82   Pulse 99   Resp 16   Ht 165.1 cm (65\")   Wt 63.2 kg (139 lb 6.4 oz)   SpO2 98%   Breastfeeding No   BMI 23.20 kg/m²     Wt Readings from Last 3 Encounters:   01/23/24 63.2 kg (139 lb 6.4 oz)   09/19/23 61 kg (134 lb 8 oz)   08/12/22 67.6 kg (149 lb)     BP Readings from Last 3 Encounters:   01/23/24 132/82   09/19/23 126/72   08/12/22 118/76     Pulse Readings from Last 3 Encounters:   01/23/24 99   09/19/23 85   08/12/22 95 "      Physical Exam  Constitutional:       Appearance: Normal appearance.   HENT:      Head: Normocephalic.   Pulmonary:      Effort: Pulmonary effort is normal.   Skin:     General: Skin is dry.   Neurological:      Mental Status: He/She/They are alert and oriented to person, place, and time.      MENTAL STATUS EXAM   General Appearance:  Cleanly groomed and dressed  Eye Contact:  Fair  Attitude:  Suspicious  Motor Activity:  Normal gait, posture  Speech:  Normal rate, tone, volume  Mood and affect:  Normal, pleasant  Thought Process:  Goal-directed  Associations/ Thought Content:  No delusions  Hallucinations:  None  Suicidal Ideations:  Not present  Homicidal Ideation:  Not present  Sensorium:  Alert  Orientation:  Person, place, time and situation  Attention Span/ Concentration:  Good  Fund of Knowledge:  Appropriate for age and educational level  Intellectual Functioning:  Average range  Insight:  Limited  Judgement:  Limited  Reliability:  Fair    PHQ-9 Depression Screening  Little interest or pleasure in doing things? 1-->several days   Feeling down, depressed, or hopeless? 0-->not at all   Trouble falling or staying asleep, or sleeping too much? 2-->more than half the days   Feeling tired or having little energy?     Poor appetite or overeating? 1-->several days   Feeling bad about yourself/you are a failure/have let yourself or your family down? 1-->several days   Trouble concentrating on things? 3-->nearly every day   Psychomotor agitation/retardation 3-->nearly every day   Thoughts about death/dying/suicide 0-->not at all   PHQ-9 Total Score 12   How difficult have these problems for you? very difficult     GAD7 Documentation:  Feeling nervous, anxious or on edge 1   Not being able to stop or control worrying 2   Worrying too much about different things 2   Trouble relaxing 3   Being so restless that it is hard to sit still 3   Becoming easily annoyed or irritable 2   Feeling Afraid as if something awful  might happen 1   GLORIA Total Score 14   How difficult have these problems made it for you?       LABS:  No visits with results within 1 Month(s) from this visit.   Latest known visit with results is:   Office Visit on 05/10/2022   Component Date Value Ref Range Status    WBC 05/10/2022 5.8  3.4 - 10.8 x10E3/uL Final    RBC 05/10/2022 3.93  3.77 - 5.28 x10E6/uL Final    Hemoglobin 05/10/2022 11.2  11.1 - 15.9 g/dL Final    Hematocrit 05/10/2022 35.7  34.0 - 46.6 % Final    MCV 05/10/2022 91  79 - 97 fL Final    MCH 05/10/2022 28.5  26.6 - 33.0 pg Final    MCHC 05/10/2022 31.4 (L)  31.5 - 35.7 g/dL Final    RDW 05/10/2022 11.9  11.7 - 15.4 % Final    Platelets 05/10/2022 240  150 - 450 x10E3/uL Final    Neutrophil Rel % 05/10/2022 45  Not Estab. % Final    Lymphocyte Rel % 05/10/2022 40  Not Estab. % Final    Monocyte Rel % 05/10/2022 10  Not Estab. % Final    Eosinophil Rel % 05/10/2022 5  Not Estab. % Final    Basophil Rel % 05/10/2022 0  Not Estab. % Final    Neutrophils Absolute 05/10/2022 2.6  1.4 - 7.0 x10E3/uL Final    Lymphocytes Absolute 05/10/2022 2.3  0.7 - 3.1 x10E3/uL Final    Monocytes Absolute 05/10/2022 0.6  0.1 - 0.9 x10E3/uL Final    Eosinophils Absolute 05/10/2022 0.3  0.0 - 0.4 x10E3/uL Final    Basophils Absolute 05/10/2022 0.0  0.0 - 0.2 x10E3/uL Final    Immature Granulocyte Rel % 05/10/2022 0  Not Estab. % Final    Immature Grans Absolute 05/10/2022 0.0  0.0 - 0.1 x10E3/uL Final    Glucose 05/10/2022 86  65 - 99 mg/dL Final    BUN 05/10/2022 11  6 - 20 mg/dL Final    Creatinine 05/10/2022 0.81  0.57 - 1.00 mg/dL Final    EGFR Result 05/10/2022 99  >59 mL/min/1.73 Final    BUN/Creatinine Ratio 05/10/2022 14  9 - 23 Final    Sodium 05/10/2022 143  134 - 144 mmol/L Final    Potassium 05/10/2022 3.9  3.5 - 5.2 mmol/L Final    Chloride 05/10/2022 104  96 - 106 mmol/L Final    Total CO2 05/10/2022 24  20 - 29 mmol/L Final    Calcium 05/10/2022 9.2  8.7 - 10.2 mg/dL Final    Total Protein 05/10/2022 6.5   6.0 - 8.5 g/dL Final    Albumin 05/10/2022 4.2  3.8 - 4.8 g/dL Final    Globulin 05/10/2022 2.3  1.5 - 4.5 g/dL Final    A/G Ratio 05/10/2022 1.8  1.2 - 2.2 Final    Total Bilirubin 05/10/2022 0.2  0.0 - 1.2 mg/dL Final    Alkaline Phosphatase 05/10/2022 56  44 - 121 IU/L Final    AST (SGOT) 05/10/2022 20  0 - 40 IU/L Final    ALT (SGPT) 05/10/2022 24  0 - 32 IU/L Final    Hemoglobin A1C 05/10/2022 5.1  4.8 - 5.6 % Final    Comment:          Prediabetes: 5.7 - 6.4           Diabetes: >6.4           Glycemic control for adults with diabetes: <7.0      Total Cholesterol 05/10/2022 169  100 - 199 mg/dL Final    Triglycerides 05/10/2022 101  0 - 149 mg/dL Final    HDL Cholesterol 05/10/2022 33 (L)  >39 mg/dL Final    VLDL Cholesterol Chirag 05/10/2022 19  5 - 40 mg/dL Final    LDL Chol Calc (NIH) 05/10/2022 117 (H)  0 - 99 mg/dL Final    TSH 05/10/2022 1.820  0.450 - 4.500 uIU/mL Final    H. pylori Breath Test 05/10/2022 Negative  Negative Final    Amylase 05/10/2022 46  31 - 110 U/L Final    Lipase 05/10/2022 25  14 - 72 U/L Final             Allergies   Allergen Reactions    Ibuprofen Anaphylaxis     Throat swells       Current Outpatient Medications   Medication Instructions    atomoxetine (Strattera) 40 MG capsule Take 1 capsule by mouth Every Morning for 7 days, THEN 2 capsules Every Morning for 23 days.    clobetasol (TEMOVATE) 0.05 % external solution APPLY TOPICALLY TO THE SCALP AT NIGHT AS NEEDED    ketoconazole (NIZORAL) 2 % shampoo 1 application , Topical, 2 Times Weekly    methadone (DOLOPHINE) 70 mg, Oral, Daily    minoxidil (LONITEN) 2.5 MG tablet 0.5 tablets, Oral, Daily    spironolactone (ALDACTONE) 100 mg, Oral, Daily     Assessment    ASSESSMENT/TREATMENT PLAN/INSTRUCTIONS/EDUCATION     (Z00.8) Evaluation by psychiatric service required    (R41.303) Poor concentration - Plan: atomoxetine (Strattera) 40 MG capsule     -The above listed condition/conditions are newly identified to this provider.  -Patient  was instructed I am not taking on any more stimulant patient's, agreed to a non-stimulant  -Start Strattera 40 mg in the morning X7 days then increase to 2 and continue  -Despite PHQ/GLORIA-7 survey scores patient reports she does not have a problem with depression or anxiety currently  -Follow-up 6 weeks    FOLLOW UP: Return in about 6 weeks (around 3/5/2024) for VIRTUAL OR IN PERSON.    MEDICATION ISSUES:  MARU: Reviewed 01/23/2024  No methadone not shown on [facility], reports she goes to local methadone clinic monthly, scanned into EMR.      There are no discontinued medications.    New Medications Ordered This Visit   Medications    atomoxetine (Strattera) 40 MG capsule     Sig: Take 1 capsule by mouth Every Morning for 7 days, THEN 2 capsules Every Morning for 23 days.     Dispense:  53 capsule     Refill:  0      No orders of the defined types were placed in this encounter.    -Barriers:  History of substance abuse  -Strengths: Motivated, assertive    -Progress toward goal: Not at goal  -Functional Status: minor/moderate impairment   -Prognosis: Fair with Ongoing Treatment      SUMMARY/DISCUSSION:  -Pt past social/medical/family history reviewed/updated. ROS conducted, medications/allergies, reviewed, patient was screened today for depression/anxiety, PHQ/GLORIA scores reviewed.  Most recent vitals/labs reviewed. Pt was given appropriate time to ask questions and concerns were addressed. A thorough discussion was had that included review of disease process, need for continued monitoring and additional treatment options including use of pharmacological and non-pharmacological approaches to care. Discussed the risks, benefits, and potential side effects of the medications; patient ackowledged and verbally consented.  Patient given handout on ADHD and Strattera to review.    -Please call the office at [phone] with any worsening of symptoms or onset of intolerable side effects, please ask to leave a message with  Emerson's medical assistant.  Please give my office up to 48 hours to respond to a patient call/question/refill request.  -Patient is agreeable to call 911 or go to the nearest ER should he/she/they have any thoughts of harm to self or others.  -Patient has been educated on providers schedule, contact information, and patient/provider expectations.   -Patient has been educated regarding multimodal approach with healthy nutrition, healthy sleep, regular physical activity, social activities, counseling, and medications.     Part of this note may be an electronic transcription/translation of spoken language to printed text using the Dragon Dictation System. Some of the data in this electronic note has been brought forward from a previous encounter, any necessary changes have been made, it has been reviewed by this APRN, and it is accurate.    This document has been electronically signed by JESUS Mccrary January 23, 2024 09:04 EST

## 2024-01-23 NOTE — PATIENT INSTRUCTIONS
GENERAL NEW PATIENT INSTRUCTIONS:    -Please arrive in person or virtually 10-15 minutes prior to appointment to allow for registration/sign in/questionnaires. If you are seen virtually please review after visit summary (AVS)/patient instructions via [email or web address] for a summary of plan of care/changes in treatment plan. If you are having difficulties logging on or accessing [email or web address] please contact my office for assistance.    -The best way to get a hold of Emerson is to call the office at [phone] and ask to leave a message with his medical assistant. Emerson Srinivasan is a Psychiatric Mental Health Nurse Practitioner, due to his specialty patient's are not able to message him directly via [email or web address]. Please give my office up to 48 hours to respond to a patient call/question/refill request. Refill requests will be made during normal office hours only, Monday-Friday 8:00-5:30.      -Emerson is out of the office on Wednesdays and weekends. Tuesdays and Thursdays are Emerson's in-office days, Mondays and Fridays are his telehealth days.  The decision to be seen virtually or in person is up to the discretion of the provider, not all behavioral health problems are appropriate for telehealth.    -Please call the office at [phone] with any worsening of symptoms or onset of intolerable side effects.  -Follow-up appointments must be maintained in order for prescribing to continue.  -Patient has been educated regarding multimodal approach with healthy nutrition, healthy sleep, regular physical activity, social activities, counseling, and medications.   -Please call 911 or go to the nearest ER if you begin to have thoughts of harming yourself or other people.

## (undated) DEVICE — 3M™ MEDIPORE™ H SOFT CLOTH SURGICAL TAPE 2864, 4 INCH X 10 YARD (10CM X 9,14M), 12 ROLLS/CASE: Brand: 3M™ MEDIPORE™

## (undated) DEVICE — KENDALL SCD EXPRESS SLEEVES, KNEE LENGTH, MEDIUM: Brand: KENDALL SCD

## (undated) DEVICE — GLV SURG BIOGEL LTX PF 7 1/2

## (undated) DEVICE — SUT PLAIN  3/0 CT1 27IN 842H

## (undated) DEVICE — SUT MNCRYL 3/0 CT1 36 IN Y944H

## (undated) DEVICE — ANTIBACTERIAL UNDYED BRAIDED (POLYGLACTIN 910), SYNTHETIC ABSORBABLE SUTURE: Brand: COATED VICRYL

## (undated) DEVICE — 3M™ STERI-STRIP™ REINFORCED ADHESIVE SKIN CLOSURES, R1547, 1/2 IN X 4 IN (12 MM X 100 MM), 6 STRIPS/ENVELOPE: Brand: 3M™ STERI-STRIP™

## (undated) DEVICE — 3M(TM) TEGADERM(TM) TRANSPARENT FILM DRESSING FRAME STYLE 1627: Brand: 3M™ TEGADERM™

## (undated) DEVICE — SOL IRR H2O BTL 1000ML STRL

## (undated) DEVICE — UNDYED BRAIDED (POLYGLACTIN 910), SYNTHETIC ABSORBABLE SUTURE: Brand: COATED VICRYL

## (undated) DEVICE — NDL HYPO ECLPS SFTY 18G 1 1/2IN

## (undated) DEVICE — SUT MNCRYL 0/0 CTX 36IN Y398H

## (undated) DEVICE — SUT VIC 0 CT1 36IN J946H

## (undated) DEVICE — 3M™ STERI-STRIP™ COMPOUND BENZOIN TINCTURE 40 BAGS/CARTON 4 CARTONS/CASE C1544: Brand: 3M™ STERI-STRIP™